# Patient Record
Sex: FEMALE | Race: WHITE | NOT HISPANIC OR LATINO | Employment: OTHER | URBAN - METROPOLITAN AREA
[De-identification: names, ages, dates, MRNs, and addresses within clinical notes are randomized per-mention and may not be internally consistent; named-entity substitution may affect disease eponyms.]

---

## 2019-09-11 ENCOUNTER — OFFICE VISIT (OUTPATIENT)
Dept: FAMILY MEDICINE | Facility: CLINIC | Age: 79
End: 2019-09-11
Payer: MEDICARE

## 2019-09-11 VITALS
WEIGHT: 195 LBS | HEART RATE: 84 BPM | BODY MASS INDEX: 38.28 KG/M2 | HEIGHT: 60 IN | DIASTOLIC BLOOD PRESSURE: 86 MMHG | SYSTOLIC BLOOD PRESSURE: 160 MMHG

## 2019-09-11 DIAGNOSIS — N18.30 TYPE 2 DIABETES MELLITUS WITH STAGE 3 CHRONIC KIDNEY DISEASE, WITH LONG-TERM CURRENT USE OF INSULIN: Primary | ICD-10-CM

## 2019-09-11 DIAGNOSIS — Z79.4 TYPE 2 DIABETES MELLITUS WITH STAGE 3 CHRONIC KIDNEY DISEASE, WITH LONG-TERM CURRENT USE OF INSULIN: Primary | ICD-10-CM

## 2019-09-11 DIAGNOSIS — E11.22 TYPE 2 DIABETES MELLITUS WITH STAGE 3 CHRONIC KIDNEY DISEASE, WITH LONG-TERM CURRENT USE OF INSULIN: Primary | ICD-10-CM

## 2019-09-11 DIAGNOSIS — K21.9 GASTROESOPHAGEAL REFLUX DISEASE, ESOPHAGITIS PRESENCE NOT SPECIFIED: ICD-10-CM

## 2019-09-11 DIAGNOSIS — E78.5 HYPERLIPIDEMIA, UNSPECIFIED HYPERLIPIDEMIA TYPE: ICD-10-CM

## 2019-09-11 DIAGNOSIS — I10 ESSENTIAL HYPERTENSION: ICD-10-CM

## 2019-09-11 LAB — HBA1C MFR BLD: 8.6 %

## 2019-09-11 PROCEDURE — 83036 POCT HEMOGLOBIN A1C: ICD-10-PCS | Mod: QW,,, | Performed by: PHYSICIAN ASSISTANT

## 2019-09-11 PROCEDURE — 99214 OFFICE O/P EST MOD 30 MIN: CPT | Mod: S$GLB,,, | Performed by: PHYSICIAN ASSISTANT

## 2019-09-11 PROCEDURE — 83036 HEMOGLOBIN GLYCOSYLATED A1C: CPT | Mod: QW,,, | Performed by: PHYSICIAN ASSISTANT

## 2019-09-11 PROCEDURE — 99214 PR OFFICE/OUTPT VISIT, EST, LEVL IV, 30-39 MIN: ICD-10-PCS | Mod: S$GLB,,, | Performed by: PHYSICIAN ASSISTANT

## 2019-09-11 RX ORDER — INSULIN GLARGINE 100 [IU]/ML
65 INJECTION, SOLUTION SUBCUTANEOUS DAILY
Qty: 1 BOX | Refills: 5 | Status: SHIPPED | OUTPATIENT
Start: 2019-09-11 | End: 2020-01-24 | Stop reason: SDUPTHER

## 2019-09-11 RX ORDER — TRAMADOL HYDROCHLORIDE AND ACETAMINOPHEN 37.5; 325 MG/1; MG/1
37.5-325 TABLET, FILM COATED ORAL
COMMUNITY
Start: 2018-08-28 | End: 2020-01-24

## 2019-09-11 RX ORDER — NYSTATIN AND TRIAMCINOLONE ACETONIDE 100000; 1 [USP'U]/G; MG/G
CREAM TOPICAL
COMMUNITY
End: 2020-01-24

## 2019-09-11 RX ORDER — SIMVASTATIN 80 MG/1
TABLET, FILM COATED ORAL
COMMUNITY
End: 2019-12-04 | Stop reason: ALTCHOICE

## 2019-09-11 RX ORDER — ATORVASTATIN CALCIUM 40 MG/1
TABLET, FILM COATED ORAL
Refills: 1 | COMMUNITY
Start: 2019-06-21 | End: 2019-12-04 | Stop reason: SDUPTHER

## 2019-09-11 RX ORDER — LOSARTAN POTASSIUM 100 MG/1
TABLET ORAL
Refills: 1 | COMMUNITY
Start: 2019-08-04 | End: 2019-11-08 | Stop reason: SDUPTHER

## 2019-09-11 RX ORDER — CHOLECALCIFEROL (VITAMIN D3) 10(400)/ML
DROPS ORAL
COMMUNITY
End: 2020-01-24

## 2019-09-11 RX ORDER — INSULIN GLARGINE 100 [IU]/ML
INJECTION, SOLUTION SUBCUTANEOUS
Refills: 5 | COMMUNITY
Start: 2019-08-22 | End: 2019-09-11 | Stop reason: SDUPTHER

## 2019-09-11 RX ORDER — GLIPIZIDE 5 MG/1
5 TABLET ORAL 3 TIMES DAILY
COMMUNITY
End: 2020-01-24 | Stop reason: SDUPTHER

## 2019-09-11 RX ORDER — FUROSEMIDE 20 MG/1
TABLET ORAL
COMMUNITY
End: 2020-01-24 | Stop reason: SDUPTHER

## 2019-09-11 RX ORDER — METFORMIN HYDROCHLORIDE 1000 MG/1
TABLET ORAL
COMMUNITY
End: 2019-11-08 | Stop reason: SDUPTHER

## 2019-09-11 RX ORDER — OMEPRAZOLE 40 MG/1
CAPSULE, DELAYED RELEASE ORAL
Refills: 1 | COMMUNITY
Start: 2019-06-21 | End: 2019-11-08 | Stop reason: SDUPTHER

## 2019-09-11 RX ORDER — DULOXETIN HYDROCHLORIDE 60 MG/1
CAPSULE, DELAYED RELEASE ORAL
Refills: 1 | COMMUNITY
Start: 2019-06-21 | End: 2020-01-24

## 2019-09-11 RX ORDER — TRAZODONE HYDROCHLORIDE 50 MG/1
TABLET ORAL
Refills: 1 | COMMUNITY
Start: 2019-07-02 | End: 2020-01-24 | Stop reason: SDUPTHER

## 2019-09-11 NOTE — PROGRESS NOTES
SUBJECTIVE:    Patient ID: Maryam White is a 79 y.o. female.    Chief Complaint: Follow-up    80 yo wf presents for regular checkup and refills. She reports that she has been doing pretty well overall. Does admit some compliance issues with diabetic diet. Says that she gets all of her normal doses. She says she very rarely has any readings over 200. A1c today 8.6% She denies any low BS readings. It seems that she has some element of cognitive impairment. She tells me that her grandson helps her with medication daily      No visits with results within 6 Month(s) from this visit.   Latest known visit with results is:   No results found for any previous visit.       History reviewed. No pertinent past medical history.  History reviewed. No pertinent surgical history.  History reviewed. No pertinent family history.    Marital Status: Single  Alcohol History:  reports that she does not drink alcohol.  Tobacco History:  reports that she has never smoked. She has never used smokeless tobacco.  Drug History:  reports that she does not use drugs.    Review of patient's allergies indicates:  No Known Allergies    Current Outpatient Medications:     amlodipine besylate (NORVASC ORAL), Take 10 mg by mouth., Disp: , Rfl:     aspirin-calcium carbonate 81 mg-300 mg calcium(777 mg) Tab, Take 81 mg by mouth., Disp: , Rfl:     atorvastatin (LIPITOR) 40 MG tablet, TK 1 T PO QD FOR CHOLESTEROL, Disp: , Rfl: 1    cholecalciferol, vitamin D3, (VITAMIN D3) 10 mcg/mL (400 unit/mL) Drop, Take by mouth., Disp: , Rfl:     DULoxetine (CYMBALTA) 60 MG capsule, TK 1 C PO QD, Disp: , Rfl: 1    furosemide (LASIX) 20 MG tablet, furosemide 20 mg tablet  qd, Disp: , Rfl:     glipiZIDE (GLUCOTROL) 5 MG tablet, glipizide 5 mg tablet, Disp: , Rfl:     LANTUS SOLOSTAR U-100 INSULIN glargine 100 units/mL (3mL) SubQ pen, Inject 65 Units into the skin once daily., Disp: 1 Box, Rfl: 5    losartan (COZAAR) 100 MG tablet, TK 1 T PO  D FOR BLOOD  PRESSURE, Disp: , Rfl: 1    metFORMIN (GLUCOPHAGE) 1000 MG tablet, metformin 1,000 mg tablet  bid, Disp: , Rfl:     nystatin-triamcinolone (MYCOLOG II) cream, nystatin-triamcinolone 100,000 unit/g-0.1 % topical cream, Disp: , Rfl:     omeprazole (PRILOSEC) 40 MG capsule, TK 1 C PO QD FOR ACID REFLUX, Disp: , Rfl: 1    simvastatin (ZOCOR) 80 MG tablet, simvastatin 80 mg tablet  qpm, Disp: , Rfl:     tramadol-acetaminophen 37.5-325 mg (ULTRACET) 37.5-325 mg Tab, Take 37.5-325 mg by mouth., Disp: , Rfl:     traZODone (DESYREL) 50 MG tablet, TK ONE T PO QHS PRN, Disp: , Rfl: 1    Review of Systems   Constitutional: Negative for appetite change, chills, fatigue, fever and unexpected weight change.   HENT: Negative for congestion.    Respiratory: Negative for cough, chest tightness and shortness of breath.    Cardiovascular: Negative for chest pain and palpitations.   Gastrointestinal: Negative for abdominal distention and abdominal pain.   Endocrine: Negative for cold intolerance and heat intolerance.   Genitourinary: Negative for difficulty urinating and dysuria.   Musculoskeletal: Negative for arthralgias and back pain.   Neurological: Negative for dizziness, weakness and headaches.          Objective:      Vitals:    09/11/19 1512   BP: (!) 160/86   Pulse: 84   Weight: 88.5 kg (195 lb)   Height: 5' (1.524 m)     Physical Exam   Constitutional: She is oriented to person, place, and time. She appears well-developed and well-nourished. No distress.   HENT:   Head: Normocephalic and atraumatic.   Eyes: Pupils are equal, round, and reactive to light. Conjunctivae and EOM are normal.   Neck: Normal range of motion. Neck supple. No thyromegaly present.   Cardiovascular: Normal rate, regular rhythm, normal heart sounds and intact distal pulses.   Pulmonary/Chest: Effort normal and breath sounds normal.   Abdominal: Soft. Bowel sounds are normal. She exhibits no distension. There is no tenderness.   Musculoskeletal:  Normal range of motion.   Neurological: She is alert and oriented to person, place, and time. No cranial nerve deficit.   Skin: Skin is warm and dry. No erythema.   Psychiatric: She has a normal mood and affect.         Assessment:       1. Type 2 diabetes mellitus with stage 3 chronic kidney disease, with long-term current use of insulin    2. Hyperlipidemia, unspecified hyperlipidemia type    3. Gastroesophageal reflux disease, esophagitis presence not specified    4. Essential hypertension         Plan:       Type 2 diabetes mellitus with stage 3 chronic kidney disease, with long-term current use of insulin  Comments:  A1c 8.6 and remains uncontrolled. I had long discussion with the pt and caregiver regarding diet. We will increase lantus to 65units. They verbalize understandi  Orders:  -     Hemoglobin A1C, POCT  -     LANTUS SOLOSTAR U-100 INSULIN glargine 100 units/mL (3mL) SubQ pen; Inject 65 Units into the skin once daily.  Dispense: 1 Box; Refill: 5    Hyperlipidemia, unspecified hyperlipidemia type  Comments:  stable, continue as is.    Gastroesophageal reflux disease, esophagitis presence not specified  Comments:  Sxs well managed. continue as is.    Essential hypertension      Follow up in about 3 months (around 12/11/2019).        9/11/2019 Asim Pate PA-C

## 2019-11-08 RX ORDER — OMEPRAZOLE 40 MG/1
CAPSULE, DELAYED RELEASE ORAL
Qty: 90 CAPSULE | Refills: 1 | Status: SHIPPED | OUTPATIENT
Start: 2019-11-08 | End: 2020-01-24 | Stop reason: SDUPTHER

## 2019-11-08 RX ORDER — METFORMIN HYDROCHLORIDE 1000 MG/1
TABLET ORAL
Qty: 180 TABLET | Refills: 1 | Status: SHIPPED | OUTPATIENT
Start: 2019-11-08 | End: 2020-01-24

## 2019-11-08 RX ORDER — LOSARTAN POTASSIUM 100 MG/1
TABLET ORAL
Qty: 90 TABLET | Refills: 1 | Status: SHIPPED | OUTPATIENT
Start: 2019-11-08 | End: 2020-01-24 | Stop reason: SDUPTHER

## 2019-11-08 RX ORDER — AMLODIPINE BESYLATE 10 MG/1
10 TABLET ORAL DAILY
Qty: 90 TABLET | Refills: 1 | Status: SHIPPED | OUTPATIENT
Start: 2019-11-08 | End: 2020-01-24

## 2019-11-08 NOTE — TELEPHONE ENCOUNTER
Spoke with pt son he is very upset bc his mom brought her bottles and she didn't get any refills.     Spoke with pharmacist at pharmacy, they have Duloxetine, Atorvastatin and Trazodone ready for her to . She needs Amlodipine. No lasix on file. Glipizide picked up on 10/3. Needs Losartan. Needs Metformin. Omeprazole picked up 10/3, no refills remain. Should not be on Simvastatin. Has 1 rx on hold for Ultracet.      She needs losartan, metformin, and omeprazole.

## 2019-12-04 ENCOUNTER — TELEPHONE (OUTPATIENT)
Dept: FAMILY MEDICINE | Facility: CLINIC | Age: 79
End: 2019-12-04

## 2019-12-04 DIAGNOSIS — E78.5 HYPERLIPIDEMIA, UNSPECIFIED HYPERLIPIDEMIA TYPE: ICD-10-CM

## 2019-12-04 DIAGNOSIS — Z79.4 TYPE 2 DIABETES MELLITUS WITH STAGE 3 CHRONIC KIDNEY DISEASE, WITH LONG-TERM CURRENT USE OF INSULIN: Primary | ICD-10-CM

## 2019-12-04 DIAGNOSIS — N18.30 TYPE 2 DIABETES MELLITUS WITH STAGE 3 CHRONIC KIDNEY DISEASE, WITH LONG-TERM CURRENT USE OF INSULIN: Primary | ICD-10-CM

## 2019-12-04 DIAGNOSIS — I10 ESSENTIAL HYPERTENSION: ICD-10-CM

## 2019-12-04 DIAGNOSIS — E11.22 TYPE 2 DIABETES MELLITUS WITH STAGE 3 CHRONIC KIDNEY DISEASE, WITH LONG-TERM CURRENT USE OF INSULIN: Primary | ICD-10-CM

## 2019-12-04 RX ORDER — ATORVASTATIN CALCIUM 40 MG/1
40 TABLET, FILM COATED ORAL DAILY
Qty: 90 TABLET | Refills: 1 | Status: SHIPPED | OUTPATIENT
Start: 2019-12-04 | End: 2020-01-24 | Stop reason: SDUPTHER

## 2019-12-04 NOTE — TELEPHONE ENCOUNTER
Son is upset that lipitor was not refilled at last ov.  Rx set up to send.  Next office visit scheduled in January.  Please sign lab orders.

## 2020-01-07 ENCOUNTER — TELEPHONE (OUTPATIENT)
Dept: FAMILY MEDICINE | Facility: CLINIC | Age: 80
End: 2020-01-07

## 2020-01-07 NOTE — TELEPHONE ENCOUNTER
LMOR to call Nancy CABRERA back so that I can let her know she is due for fasting lab work and an office visit.

## 2020-01-07 NOTE — TELEPHONE ENCOUNTER
Spoke to Kofi, son, that fasting lab is due as well as an office visit. Would like for her to see Dr Escoto. She had an appointment with him tomorrow but had to cancel because her son couldn't get her here. Said that he wants her to see Dr Escoto only this time, that it's been a couple of years, and she has had a hard time with her refills for years. He wants to get this straight, states she brings medication bottles every time but is still having issues. Please give work in with Dr Escoto after January 20th. Kofi [phone]

## 2020-01-08 NOTE — TELEPHONE ENCOUNTER
LMOR for Kofi that Ms Francisco is scheduled for January 24th at 10:40 and to bring all medication bottles, prescription and over the counter so that we can get her medications straight. Asked him to call with any questions, otherwise keep that office visit.

## 2020-01-24 ENCOUNTER — OFFICE VISIT (OUTPATIENT)
Dept: FAMILY MEDICINE | Facility: CLINIC | Age: 80
End: 2020-01-24
Payer: MEDICARE

## 2020-01-24 VITALS
HEART RATE: 68 BPM | HEIGHT: 60 IN | DIASTOLIC BLOOD PRESSURE: 80 MMHG | SYSTOLIC BLOOD PRESSURE: 134 MMHG | WEIGHT: 190 LBS | BODY MASS INDEX: 37.3 KG/M2

## 2020-01-24 DIAGNOSIS — M15.9 POLYARTICULAR OSTEOARTHRITIS: ICD-10-CM

## 2020-01-24 DIAGNOSIS — R41.89 COGNITIVE IMPAIRMENT: ICD-10-CM

## 2020-01-24 DIAGNOSIS — E11.22 TYPE 2 DIABETES MELLITUS WITH STAGE 3 CHRONIC KIDNEY DISEASE, WITH LONG-TERM CURRENT USE OF INSULIN: ICD-10-CM

## 2020-01-24 DIAGNOSIS — F51.01 PRIMARY INSOMNIA: ICD-10-CM

## 2020-01-24 DIAGNOSIS — K21.9 GASTROESOPHAGEAL REFLUX DISEASE WITHOUT ESOPHAGITIS: ICD-10-CM

## 2020-01-24 DIAGNOSIS — E78.5 HYPERLIPIDEMIA, UNSPECIFIED HYPERLIPIDEMIA TYPE: ICD-10-CM

## 2020-01-24 DIAGNOSIS — Z79.4 TYPE 2 DIABETES MELLITUS WITH STAGE 3 CHRONIC KIDNEY DISEASE, WITH LONG-TERM CURRENT USE OF INSULIN: Primary | ICD-10-CM

## 2020-01-24 DIAGNOSIS — N18.30 TYPE 2 DIABETES MELLITUS WITH STAGE 3 CHRONIC KIDNEY DISEASE, WITH LONG-TERM CURRENT USE OF INSULIN: ICD-10-CM

## 2020-01-24 DIAGNOSIS — Z79.4 TYPE 2 DIABETES MELLITUS WITH STAGE 3 CHRONIC KIDNEY DISEASE, WITH LONG-TERM CURRENT USE OF INSULIN: ICD-10-CM

## 2020-01-24 DIAGNOSIS — F32.A DEPRESSIVE DISORDER: ICD-10-CM

## 2020-01-24 DIAGNOSIS — N18.30 TYPE 2 DIABETES MELLITUS WITH STAGE 3 CHRONIC KIDNEY DISEASE, WITH LONG-TERM CURRENT USE OF INSULIN: Primary | ICD-10-CM

## 2020-01-24 DIAGNOSIS — Z79.4 TYPE 2 DIABETES MELLITUS WITH OTHER SPECIFIED COMPLICATION, WITH LONG-TERM CURRENT USE OF INSULIN: ICD-10-CM

## 2020-01-24 DIAGNOSIS — I10 ESSENTIAL HYPERTENSION: ICD-10-CM

## 2020-01-24 DIAGNOSIS — L40.9 PSORIASIS: ICD-10-CM

## 2020-01-24 DIAGNOSIS — E78.2 MIXED HYPERLIPIDEMIA: ICD-10-CM

## 2020-01-24 DIAGNOSIS — E11.22 TYPE 2 DIABETES MELLITUS WITH STAGE 3 CHRONIC KIDNEY DISEASE, WITH LONG-TERM CURRENT USE OF INSULIN: Primary | ICD-10-CM

## 2020-01-24 DIAGNOSIS — E11.69 TYPE 2 DIABETES MELLITUS WITH OTHER SPECIFIED COMPLICATION, WITH LONG-TERM CURRENT USE OF INSULIN: ICD-10-CM

## 2020-01-24 PROCEDURE — 1159F MED LIST DOCD IN RCRD: CPT | Mod: S$GLB,,, | Performed by: FAMILY MEDICINE

## 2020-01-24 PROCEDURE — 83036 HEMOGLOBIN GLYCOSYLATED A1C: CPT | Mod: QW,,, | Performed by: FAMILY MEDICINE

## 2020-01-24 PROCEDURE — 1159F PR MEDICATION LIST DOCUMENTED IN MEDICAL RECORD: ICD-10-PCS | Mod: S$GLB,,, | Performed by: FAMILY MEDICINE

## 2020-01-24 PROCEDURE — 83036 POCT HEMOGLOBIN A1C: ICD-10-PCS | Mod: QW,,, | Performed by: FAMILY MEDICINE

## 2020-01-24 PROCEDURE — 99214 OFFICE O/P EST MOD 30 MIN: CPT | Mod: S$GLB,,, | Performed by: FAMILY MEDICINE

## 2020-01-24 PROCEDURE — 99214 PR OFFICE/OUTPT VISIT, EST, LEVL IV, 30-39 MIN: ICD-10-PCS | Mod: S$GLB,,, | Performed by: FAMILY MEDICINE

## 2020-01-24 RX ORDER — LOSARTAN POTASSIUM 100 MG/1
TABLET ORAL
Qty: 90 TABLET | Refills: 1 | Status: SHIPPED | OUTPATIENT
Start: 2020-01-24

## 2020-01-24 RX ORDER — AMOXICILLIN 500 MG
CAPSULE ORAL DAILY
COMMUNITY
End: 2020-01-24

## 2020-01-24 RX ORDER — LANCETS 33 GAUGE
EACH MISCELLANEOUS
COMMUNITY
End: 2020-01-24 | Stop reason: SDUPTHER

## 2020-01-24 RX ORDER — GABAPENTIN 300 MG/1
300 CAPSULE ORAL 3 TIMES DAILY
Qty: 270 CAPSULE | Refills: 1 | Status: SHIPPED | OUTPATIENT
Start: 2020-01-24 | End: 2023-01-13 | Stop reason: ALTCHOICE

## 2020-01-24 RX ORDER — GLIPIZIDE 5 MG/1
5 TABLET ORAL 3 TIMES DAILY
Qty: 270 TABLET | Refills: 1 | Status: SHIPPED | OUTPATIENT
Start: 2020-01-24 | End: 2020-04-23

## 2020-01-24 RX ORDER — AMITRIPTYLINE HYDROCHLORIDE 25 MG/1
25 TABLET, FILM COATED ORAL NIGHTLY PRN
COMMUNITY
End: 2020-01-24

## 2020-01-24 RX ORDER — FUROSEMIDE 20 MG/1
TABLET ORAL
Qty: 90 TABLET | Refills: 1 | Status: SHIPPED | OUTPATIENT
Start: 2020-01-24 | End: 2020-01-24 | Stop reason: SDUPTHER

## 2020-01-24 RX ORDER — OMEPRAZOLE 40 MG/1
CAPSULE, DELAYED RELEASE ORAL
Qty: 90 CAPSULE | Refills: 1 | Status: SHIPPED | OUTPATIENT
Start: 2020-01-24 | End: 2023-01-13 | Stop reason: ALTCHOICE

## 2020-01-24 RX ORDER — GABAPENTIN 300 MG/1
300 CAPSULE ORAL 3 TIMES DAILY
COMMUNITY
End: 2020-01-24 | Stop reason: SDUPTHER

## 2020-01-24 RX ORDER — LANCETS 33 GAUGE
1 EACH MISCELLANEOUS 2 TIMES DAILY PRN
Qty: 100 EACH | Refills: 1 | Status: SHIPPED | OUTPATIENT
Start: 2020-01-24

## 2020-01-24 RX ORDER — METFORMIN HYDROCHLORIDE 1000 MG/1
TABLET ORAL
Qty: 180 TABLET | Refills: 1 | Status: SHIPPED | OUTPATIENT
Start: 2020-01-24 | End: 2023-01-13 | Stop reason: ALTCHOICE

## 2020-01-24 RX ORDER — FUROSEMIDE 20 MG/1
20 TABLET ORAL DAILY
Qty: 90 TABLET | Refills: 1 | Status: SHIPPED | OUTPATIENT
Start: 2020-01-24 | End: 2023-01-13

## 2020-01-24 RX ORDER — INSULIN GLARGINE 100 [IU]/ML
50 INJECTION, SOLUTION SUBCUTANEOUS DAILY
Qty: 3 BOX | Refills: 3 | Status: SHIPPED | OUTPATIENT
Start: 2020-01-24 | End: 2020-07-22

## 2020-01-24 RX ORDER — TRAZODONE HYDROCHLORIDE 50 MG/1
TABLET ORAL
Qty: 90 TABLET | Refills: 1 | Status: SHIPPED | OUTPATIENT
Start: 2020-01-24

## 2020-01-24 RX ORDER — DOCUSATE SODIUM 100 MG/1
100 CAPSULE, LIQUID FILLED ORAL 2 TIMES DAILY
COMMUNITY
End: 2020-01-24

## 2020-01-24 RX ORDER — ATORVASTATIN CALCIUM 40 MG/1
40 TABLET, FILM COATED ORAL DAILY
Qty: 90 TABLET | Refills: 1 | Status: SHIPPED | OUTPATIENT
Start: 2020-01-24 | End: 2020-04-23

## 2020-01-24 RX ORDER — FERROUS GLUCONATE 324(38)MG
324 TABLET ORAL
COMMUNITY
End: 2020-01-24

## 2020-01-24 NOTE — PROGRESS NOTES
SUBJECTIVE:    Patient ID: Maryam White is a 79 y.o. female.    Chief Complaint: Follow-up (no bottles  tb//set up for foot exam// A1c 7.7)    This 79-year-old female lives with her son in the [place] area.  The son states that she stays in her room and watches TV most of the time is able to move about the house but does not get out of the house much.  She has no recent falls she does eat 3 meals a day provided by her family.  They noticed a cognitive decline and problems with her memory.  She is independent in her showering but does no cooking or house cleaning chores at all.  When questioned about her medications or her insulin she does not remember the dosages or the names of any medications.  She relies on her family to provide these medications for her.      Office Visit on 2019   Component Date Value Ref Range Status    Hemoglobin A1C 2019 8.6  % Final       Past Medical History:   Diagnosis Date    Cataract     Depression     Diabetes mellitus, type 2     Hypertension      Past Surgical History:   Procedure Laterality Date     SECTION       SECTION      GALLBLADDER SURGERY      TONSILLECTOMY       No family history on file.    Marital Status: Single  Alcohol History:  reports that she does not drink alcohol.  Tobacco History:  reports that she has never smoked. She has never used smokeless tobacco.  Drug History:  reports that she does not use drugs.    Review of patient's allergies indicates:  No Known Allergies    Current Outpatient Medications:     aspirin-calcium carbonate 81 mg-300 mg calcium(777 mg) Tab, Take 81 mg by mouth., Disp: , Rfl:     atorvastatin (LIPITOR) 40 MG tablet, Take 1 tablet (40 mg total) by mouth once daily., Disp: 90 tablet, Rfl: 1    gabapentin (NEURONTIN) 300 MG capsule, Take 300 mg by mouth 3 (three) times daily., Disp: , Rfl:     glipiZIDE (GLUCOTROL) 5 MG tablet, Take 5 mg by mouth 3 (three) times daily. , Disp: , Rfl:     lancets  (BD ULTRA FINE LANCETS) 33 gauge Misc, by Misc.(Non-Drug; Combo Route) route., Disp: , Rfl:     liraglutide 0.6 mg/0.1 mL, 18 mg/3 mL, subq PNIJ (VICTOZA 2-ALICE) 0.6 mg/0.1 mL (18 mg/3 mL) PnIj, Inject into the skin., Disp: , Rfl:     losartan (COZAAR) 100 MG tablet, TK 1 T PO  D FOR BLOOD PRESSURE, Disp: 90 tablet, Rfl: 1    omeprazole (PRILOSEC) 40 MG capsule, TK 1 C PO QD FOR ACID REFLUX, Disp: 90 capsule, Rfl: 1    traZODone (DESYREL) 50 MG tablet, TK ONE T PO QHS PRN, Disp: , Rfl: 1    furosemide (LASIX) 20 MG tablet, furosemide 20 mg tablet  qd, Disp: , Rfl:     LANTUS SOLOSTAR U-100 INSULIN glargine 100 units/mL (3mL) SubQ pen, Inject 65 Units into the skin once daily., Disp: 1 Box, Rfl: 5    Review of Systems   Constitutional: Negative for appetite change, chills, fatigue, fever and unexpected weight change.   HENT: Negative for congestion, ear pain, sinus pain, sore throat and trouble swallowing.    Eyes: Negative for pain, discharge and visual disturbance.   Respiratory: Negative for apnea, cough, shortness of breath and wheezing.    Cardiovascular: Negative for chest pain, palpitations and leg swelling.   Gastrointestinal: Negative for abdominal pain, blood in stool, constipation, diarrhea, nausea and vomiting.        No gerd   Endocrine: Negative for heat intolerance, polydipsia and polyuria.   Genitourinary: Negative for difficulty urinating, dyspareunia, dysuria, frequency, hematuria and menstrual problem.        Nocturia 1-2 x  night   Musculoskeletal: Negative for arthralgias (rt lower  hip aches,), back pain, gait problem, joint swelling and myalgias.   Allergic/Immunologic: Negative for environmental allergies, food allergies and immunocompromised state.   Neurological: Negative for dizziness, tremors, seizures, numbness (rt  sided  sciatica, on gabapentin) and headaches.   Psychiatric/Behavioral: Negative for behavioral problems, confusion, hallucinations and suicidal ideas. The patient is  not nervous/anxious.           Objective:      Vitals:    01/24/20 1111   BP: 134/80   Pulse: 68   Weight: 86.2 kg (190 lb)   Height: 5' (1.524 m)     Body mass index is 37.11 kg/m².  Physical Exam   Constitutional: She is oriented to person, place, and time. She appears well-developed and well-nourished.   HENT:   Head: Normocephalic and atraumatic.   Right Ear: External ear normal.   Left Ear: External ear normal.   Nose: Nose normal.   Mouth/Throat: Oropharynx is clear and moist.   Eyes: Pupils are equal, round, and reactive to light. EOM are normal.   Neck: Normal range of motion. Neck supple. Carotid bruit is not present. No thyromegaly present.   Cardiovascular: Normal rate, regular rhythm, normal heart sounds and intact distal pulses.   No murmur heard.  Pulses:       Dorsalis pedis pulses are 2+ on the right side, and 2+ on the left side.        Posterior tibial pulses are 2+ on the right side, and 2+ on the left side.   Pulmonary/Chest: Effort normal and breath sounds normal. She has no wheezes. She has no rales.   Abdominal: Soft. Bowel sounds are normal. She exhibits no distension. There is no hepatosplenomegaly. There is no tenderness.   Musculoskeletal: Normal range of motion. She exhibits no tenderness or deformity.        Lumbar back: Normal. She exhibits no pain and no spasm.   Bends 90 degrees at  waist shoulders and knees have full range of motion.  She has no pitting edema to her lower extremities.  Her gait is somewhat slow and cautious, balance is somewhat off   Feet:   Right Foot:   Protective Sensation: 5 sites tested. 5 sites sensed.   Skin Integrity: Negative for ulcer or callus.   Left Foot:   Protective Sensation: 5 sites tested. 5 sites sensed.   Skin Integrity: Negative for ulcer or callus.   Lymphadenopathy:     She has no cervical adenopathy.   Neurological: She is alert and oriented to person, place, and time. No cranial nerve deficit. Coordination normal.   Mild hearing loss present.   She can remember only 1 out of the last 3 current president's   Skin: Skin is warm and dry. No rash noted.   Large patch of psoriasis on her lower back   Psychiatric: She has a normal mood and affect. Her behavior is normal. Judgment and thought content normal.   Short-term memory loss is evident   Nursing note and vitals reviewed.        Assessment:       1. Type 2 diabetes mellitus with stage 3 chronic kidney disease, with long-term current use of insulin    2. Depressive disorder    3. Psoriasis    4. Mixed hyperlipidemia    5. Essential hypertension    6. Type 2 diabetes mellitus with other specified complication, with long-term current use of insulin    7. Gastroesophageal reflux disease without esophagitis    8. Hyperlipidemia, unspecified hyperlipidemia type    9. Type 2 diabetes mellitus with stage 3 chronic kidney disease, with long-term current use of insulin    10. Cognitive impairment    11. Polyarticular osteoarthritis         Plan:       Type 2 diabetes mellitus with stage 3 chronic kidney disease, with long-term current use of insulin  A1c was 7.7 today which is improved from her former level.  She has only been taking 30 units of Lantus daily (recommended dose  was 65 units) due to her cognitive decline I fear she is not remembering to take her medications or her insulin appropriately  Depressive disorder    Psoriasis  Moderate psoriasis on her lower back which she is using a steroid cream borrowed from her son  Mixed hyperlipidemia  Labs ordered for next visit in 3 months  Essential hypertension  Blood pressure well controlled  Type 2 diabetes mellitus with other specified complication, with long-term current use of insulin  Diabetes is not in control yet  Gastroesophageal reflux disease without esophagitis    Hyperlipidemia, unspecified hyperlipidemia type    Type 2 diabetes mellitus with stage 3 chronic kidney disease, with long-term current use of insulin    Cognitive impairment  It looks like  patient developing Alzheimer's dementia.  We will do a mini-mental status exam in the future  Polyarticular osteoarthritis  Managing fairly well with her joint pains    No follow-ups on file.

## 2020-01-27 LAB — HBA1C MFR BLD: 7.7 %

## 2020-02-13 ENCOUNTER — TELEPHONE (OUTPATIENT)
Dept: FAMILY MEDICINE | Facility: CLINIC | Age: 80
End: 2020-02-13

## 2020-02-13 NOTE — TELEPHONE ENCOUNTER
----- Message from Angie Yao sent at 2/13/2020 10:29 AM CST -----  Contact:  Medical Pharmacy   AT 8:46 AM  The pharmacy said they faxed over a form for the patient and it was something about her being a diabetic. They needed some information. Please call [phone] GH

## 2020-02-27 ENCOUNTER — TELEPHONE (OUTPATIENT)
Dept: FAMILY MEDICINE | Facility: CLINIC | Age: 80
End: 2020-02-27

## 2020-02-27 NOTE — TELEPHONE ENCOUNTER
----- Message from Fatmata Ovalle sent at 2/27/2020 11:20 AM CST -----  [facility] about [facility] order about pt diabetic needles and medical records   Dorinda [phone]

## 2020-04-17 ENCOUNTER — TELEPHONE (OUTPATIENT)
Dept: FAMILY MEDICINE | Facility: CLINIC | Age: 80
End: 2020-04-17

## 2020-04-17 RX ORDER — DULOXETIN HYDROCHLORIDE 60 MG/1
60 CAPSULE, DELAYED RELEASE ORAL DAILY
Qty: 90 CAPSULE | Refills: 1 | Status: SHIPPED | OUTPATIENT
Start: 2020-04-17 | End: 2020-04-21 | Stop reason: SDUPTHER

## 2020-04-17 NOTE — TELEPHONE ENCOUNTER
----- Message from Stephanie Hernandes sent at 4/17/2020  8:55 AM CDT -----  Patients son his calling saying that his mother has been having issues with her rxs for years . Patients son went to [facility] and they stated they didn't have that medication on file for her to refill please give the son a call [phone] dirk arellano he states dr marley told him to call him when this happened again

## 2020-04-17 NOTE — TELEPHONE ENCOUNTER
Refill submitted to [facility].       Son wants Dr Escoto to know he had another refill issue. Please print message for him. Cymbalta was not in profile but was in history. Son will also activate portal so appointment can be converted. Thanks

## 2020-04-21 ENCOUNTER — OFFICE VISIT (OUTPATIENT)
Dept: FAMILY MEDICINE | Facility: CLINIC | Age: 80
End: 2020-04-21
Payer: MEDICARE

## 2020-04-21 VITALS
TEMPERATURE: 99 F | DIASTOLIC BLOOD PRESSURE: 68 MMHG | OXYGEN SATURATION: 98 % | SYSTOLIC BLOOD PRESSURE: 114 MMHG | HEART RATE: 68 BPM

## 2020-04-21 DIAGNOSIS — I10 ESSENTIAL HYPERTENSION: Primary | ICD-10-CM

## 2020-04-21 DIAGNOSIS — R41.89 COGNITIVE IMPAIRMENT: ICD-10-CM

## 2020-04-21 DIAGNOSIS — E11.69 TYPE 2 DIABETES MELLITUS WITH OTHER SPECIFIED COMPLICATION, WITH LONG-TERM CURRENT USE OF INSULIN: ICD-10-CM

## 2020-04-21 DIAGNOSIS — Z79.4 TYPE 2 DIABETES MELLITUS WITH OTHER SPECIFIED COMPLICATION, WITH LONG-TERM CURRENT USE OF INSULIN: ICD-10-CM

## 2020-04-21 PROCEDURE — 99213 OFFICE O/P EST LOW 20 MIN: CPT | Mod: 95,,, | Performed by: PHYSICIAN ASSISTANT

## 2020-04-21 PROCEDURE — 99213 PR OFFICE/OUTPT VISIT, EST, LEVL III, 20-29 MIN: ICD-10-PCS | Mod: 95,,, | Performed by: PHYSICIAN ASSISTANT

## 2020-04-21 RX ORDER — DULOXETIN HYDROCHLORIDE 60 MG/1
60 CAPSULE, DELAYED RELEASE ORAL DAILY
Qty: 90 CAPSULE | Refills: 1 | Status: SHIPPED | OUTPATIENT
Start: 2020-04-21 | End: 2020-10-18

## 2020-04-21 NOTE — PATIENT INSTRUCTIONS
Diet: Diabetes  Food is an important tool that you can use to control diabetes and stay healthy. Eating well-balanced meals in the correct amounts will help you control your blood glucose levels and prevent low blood sugar reactions. It will also help you reduce the health risks of diabetes. There is no one specific diet that is right for everyone with diabetes. But there are general guidelines to follow. A registered dietitian (RD) will create a tailored diet approach thats just right for you. He or she will also help you plan healthy meals and snacks. If you have any questions, call your dietitian for advice.     Guidelines for success  Talk with your healthcare provider before starting a diabetes diet or weight loss program. If you haven't talked with a dietitian yet, ask your provider for a referral. The following guidelines can help you succeed:  · Select foods from the 6 food groups below. Your dietitian will help you find food choices within each group. He or she will also show you serving sizes and how many servings you can have at each meal.  ¨ Grains, beans, and starchy vegetables  ¨ Vegetables  ¨ Fruit  ¨ Milk or yogurt  ¨ Meat, poultry, fish, or tofu  ¨ Healthy fats  · Check your blood sugar levels as directed by your provider. Take any medicine as prescribed by your provider.  · Learn to read food labels and pick the right portion sizes.  · Eat only the amount of food in your meal plan. Eat about the same amount of food at regular times each day. Dont skip meals. Eat meals 4 to 5 hours apart, with snacks in between.  · Limit alcohol. It raises blood sugar levels. Drink water or calorie-free diet drinks that use safe sweeteners.  · Eat less fat to help lower your risk of heart disease. Use nonfat or low-fat dairy products and lean meats. Avoid fried foods. Use cooking oils that are unsaturated, such as olive, canola, or peanut oil.  · Talk with your dietitian about safe sugar substitutes.  · Avoid  added salt. It can contribute to high blood pressure, which can cause heart disease. People with diabetes already have a risk of high blood pressure and heart disease.  · Stay at a healthy weight. If you need to lose weight, cut down on your portion sizes. But dont skip meals. Exercise is an important part of any weight management program. Talk with your provider about an exercise program thats right for you.  · For more information about the best diet plan for you, talk with a registered dietitian (RD). To find an RD in your area, contact:  ¨ Academy of Nutrition and Dietetics www.eatright.org  ¨ The American Diabetes Association [phone] www.diabetes.org  Date Last Reviewed: 8/1/2016 © 2000-2017 [employer]. [address] [place] [address], [place] [address]. All rights reserved. This information is not intended as a substitute for professional medical care. Always follow your healthcare professional's instructions.

## 2020-05-19 ENCOUNTER — TELEPHONE (OUTPATIENT)
Dept: FAMILY MEDICINE | Facility: CLINIC | Age: 80
End: 2020-05-19

## 2020-05-30 NOTE — PROGRESS NOTES
Subjective:        The chief complaint leading to consultation is: 3 month followup  The patient location is:  Home  Visit type: Virtual visit with synchronous audio/video or audio only  This was a video visit in lieu of in-person visit due to the coronavirus emergency. Patient acknowledged and consented to the video visit encounter.     This is a very pleasant 79-year-old white female who presents today for a virtual telemedicine conference.  She reports that she has been at her baseline.  Keeping herself at home as much as possible.  Her son does all of her shopping and helps her out around the house.  They report that her blood sugars have been averaging between 130 and 170.  He does not give the full dose of Lantus when her blood sugar is less than 100 upon awakening.  Her blood pressure has looked great and in fact we are recorded the pressure reading today.  She does need refills of her Cymbalta.  We want to get all of her other refills when we see her back in 3 months.  They are planning to get her blood work done ASAP.      Past Surgical History:   Procedure Laterality Date     SECTION       SECTION      GALLBLADDER SURGERY      TONSILLECTOMY       Past Medical History:   Diagnosis Date    Cataract     Depression     Diabetes mellitus, type 2     Hypertension      History reviewed. No pertinent family history.     Social History:   Marital Status: Single  Alcohol History:  reports that she does not drink alcohol.  Tobacco History:  reports that she has never smoked. She has never used smokeless tobacco.  Drug History:  reports that she does not use drugs.    Review of patient's allergies indicates:   Allergen Reactions    Metformin hcl      Other reaction(s): GEMA    Naproxen sodium      Other reaction(s): GEMA       Current Outpatient Medications   Medication Sig Dispense Refill    aspirin-calcium carbonate 81 mg-300 mg calcium(777 mg) Tab Take 81 mg by mouth.      atorvastatin  Pt taking ASA only  Is not prescribed a STATIN  Pt is debilitated and transfers from recliner to W/C  PT/OT eval and treat     (LIPITOR) 40 MG tablet Take 1 tablet (40 mg total) by mouth once daily. 90 tablet 1    DULoxetine (CYMBALTA) 60 MG capsule Take 1 capsule (60 mg total) by mouth once daily. 90 capsule 1    furosemide (LASIX) 20 MG tablet Take 1 tablet (20 mg total) by mouth once daily. furosemide 20 mg tablet  qd 90 tablet 1    gabapentin (NEURONTIN) 300 MG capsule Take 1 capsule (300 mg total) by mouth 3 (three) times daily. 270 capsule 1    glipiZIDE (GLUCOTROL) 5 MG tablet Take 1 tablet (5 mg total) by mouth 3 (three) times daily. 270 tablet 1    lancets (BD ULTRA FINE LANCETS) 33 gauge Misc 1 lancet by Misc.(Non-Drug; Combo Route) route 2 (two) times daily as needed. 100 each 1    LANTUS SOLOSTAR U-100 INSULIN glargine 100 units/mL (3mL) SubQ pen Inject 50 Units into the skin once daily. 3 Box 3    liraglutide 0.6 mg/0.1 mL, 18 mg/3 mL, subq PNIJ (VICTOZA 2-ALICE) 0.6 mg/0.1 mL (18 mg/3 mL) PnIj Inject into the skin.      losartan (COZAAR) 100 MG tablet TK 1 T PO  D FOR BLOOD PRESSURE 90 tablet 1    metFORMIN (GLUCOPHAGE) 1000 MG tablet metformin 1,000 mg tablet  bid 180 tablet 1    omeprazole (PRILOSEC) 40 MG capsule TK 1 C PO QD FOR ACID REFLUX 90 capsule 1    traZODone (DESYREL) 50 MG tablet TK ONE T PO QHS PRN 90 tablet 1     No current facility-administered medications for this visit.        Review of Systems   Constitutional: Negative for appetite change, chills, fatigue, fever and unexpected weight change.   HENT: Negative for congestion.    Respiratory: Negative for cough, chest tightness and shortness of breath.    Cardiovascular: Negative for chest pain and palpitations.   Gastrointestinal: Negative for abdominal distention and abdominal pain.   Endocrine: Negative for cold intolerance and heat intolerance.   Genitourinary: Negative for difficulty urinating and dysuria.   Musculoskeletal: Negative for arthralgias and back pain.   Neurological: Negative for dizziness, weakness and headaches.         Objective:         Physical Exam:   Physical Exam   Constitutional: She appears well-developed and well-nourished.   HENT:   Head: Normocephalic and atraumatic.   Neck: Normal range of motion.   Pulmonary/Chest: Effort normal. No respiratory distress.            Assessment:       1. Essential hypertension    2. Type 2 diabetes mellitus with other specified complication, with long-term current use of insulin    3. Cognitive impairment      Plan:   Essential hypertension  Comments:  Blood pressure remains well controlled.  Continue as is    Type 2 diabetes mellitus with other specified complication, with long-term current use of insulin  Comments:  I suspect that her A1c is low to mid 7s.  I do not want to push her blood sugars too much given her age.  Leave Lantus dose where it is    Cognitive impairment  Comments:  Appears stable at this time.    Other orders  -     DULoxetine (CYMBALTA) 60 MG capsule; Take 1 capsule (60 mg total) by mouth once daily.  Dispense: 90 capsule; Refill: 1      Follow up in about 3 months (around 7/21/2020) for Diabetic Check-Up.    Total time spent with patient: 20min    Each patient to whom he or she provides medical services by telemedicine is:  (1) informed of the relationship between the physician and patient and the respective role of any other health care provider with respect to management of the patient; and (2) notified that he or she may decline to receive medical services by telemedicine and may withdraw from such care at any time.    This note was created using [employer] voice recognition software that occasionally misinterprets phrases or words.

## 2020-06-05 ENCOUNTER — TELEPHONE (OUTPATIENT)
Dept: FAMILY MEDICINE | Facility: CLINIC | Age: 80
End: 2020-06-05

## 2020-06-05 NOTE — TELEPHONE ENCOUNTER
----- Message from Huong Barkley sent at 6/5/2020 10:11 AM CDT -----  Contact: Kofi White pt's son   Son says that he needs a doctors not stating why he can't be around the public for his job due to the fact that he cares for his mother and is taking care of her. Please give the patient son a call back # [phone]

## 2020-06-05 NOTE — TELEPHONE ENCOUNTER
Son aware you are not in clinic. Says he is going to be wearing a mask on Monday but would like to have a note saying he can not return to work currently because he is caring for his mother that was advised to stay inside by you during last virtual visit. Please advise.    ] Son is aware you are not in clinic. Thanks

## 2020-06-08 NOTE — TELEPHONE ENCOUNTER
I do not necessarily think that the patient has to stay home from work to safely care for his mother.  Plus I cannot mandate that his job keep him home when he has to be present for his job and does not have the option to work from home.  If he needs FMLA paperwork completed he should seek this out with his primary care provider

## 2020-06-15 ENCOUNTER — TELEPHONE (OUTPATIENT)
Dept: FAMILY MEDICINE | Facility: CLINIC | Age: 80
End: 2020-06-15

## 2020-06-15 NOTE — TELEPHONE ENCOUNTER
----- Message from Huong Barkley sent at 6/15/2020  2:02 PM CDT -----  Regarding: Leg and Hip pain  Contact: Kofi juan pablo's son  Son says that the patient is having pain in her leg and hip area for the past few weeks now and son would like to get her in for an appt this week if possible? Please give the son a call back Kofi's # [phone]

## 2023-01-13 ENCOUNTER — HOSPITAL ENCOUNTER (OUTPATIENT)
Dept: PREADMISSION TESTING | Facility: HOSPITAL | Age: 83
Discharge: HOME | End: 2023-01-13
Attending: INTERNAL MEDICINE
Payer: MEDICARE

## 2023-01-13 VITALS
HEART RATE: 82 BPM | BODY MASS INDEX: 34.75 KG/M2 | OXYGEN SATURATION: 95 % | TEMPERATURE: 98 F | SYSTOLIC BLOOD PRESSURE: 170 MMHG | RESPIRATION RATE: 18 BRPM | DIASTOLIC BLOOD PRESSURE: 80 MMHG | HEIGHT: 62 IN

## 2023-01-13 DIAGNOSIS — Z01.818 PRE-OP TESTING: Primary | ICD-10-CM

## 2023-01-13 PROCEDURE — 93010 ELECTROCARDIOGRAM REPORT: CPT | Mod: ,,, | Performed by: SPECIALIST

## 2023-01-13 PROCEDURE — 93010 EKG 12-LEAD: ICD-10-PCS | Mod: ,,, | Performed by: SPECIALIST

## 2023-01-13 PROCEDURE — 93005 ELECTROCARDIOGRAM TRACING: CPT | Performed by: SPECIALIST

## 2023-01-13 RX ORDER — TRIAMCINOLONE ACETONIDE 0.25 MG/G
CREAM TOPICAL 2 TIMES DAILY
COMMUNITY

## 2023-01-13 RX ORDER — ASPIRIN 81 MG/1
81 TABLET ORAL DAILY
COMMUNITY

## 2023-01-13 NOTE — DISCHARGE INSTRUCTIONS
To confirm, Your doctor has instructed you that procedure is scheduled for: Jan 17    1 Person can come with you the day of surgery     Endoscopy nurse will call Friday with your arrival time.      Please  Enter at [facility] [address] and come through front entrance.       Check in at registration.     After registration, proceed past gift shop and through glass door ( [facility]) Check in at the nurses station to the left.       Do not eat or drink anything after midnight the night before your surgery - THIS INCLUDES  WATER, GUM, MINTS AND CANDY.  YOU MAY BRUSH YOUR TEETH BUT DO NOT SWALLOW       TAKE ONLY THESE MEDICATIONS WITH A SMALL SIP OF WATER THE MORNING OF YOUR PROCEDURE: NONE      PLEASE NOTE:  The surgery schedule has many variables which may affect the time of your surgery case.  Family members should be available if your surgery time changes.  Plan to be here the day of your procedure between 4-6 hours.      DO NOT TAKE THESE MEDICATIONS 5-7 DAYS PRIOR to your procedure or per your surgeon's request: ASPIRIN, ALEVE, ADVIL, IBUPROFEN,  PRISCILLA SELTZER, BC , FISH OIL , VITAMIN E, HERBALS  (May take Tylenol)                                                        IMPORTANT INSTRUCTIONS      Do not smoke, vape or drink alcoholic beverages 24 hours prior to your procedure.  Shower the night before  and sleep in a bed with clean sheets.  Do not sleep with a pet in the bed.       If you wear contact lenses, dentures, hearing aids or glasses, bring a container to put them in during surgery and give to a family member for safe keeping.    Please leave all jewelry, piercing's and valuables at home.   Wear rubber soled shoes (no flip flops).  ONLY for patients requiring bowel prep, written instructions will be given by your doctor's office.  If your doctor has scheduled you for an overnight stay, bring a small overnight bag with any personal items you need.    Make arrangements in advance for transportation home  by a responsible adult.      You must make arrangements for transportation, TAXI'S, UBER'S OR LYFTS ARE NOT ALLOWED.        If you have any questions about these instructions, call (Monday - Friday)  Cduxlfdrg [phone]

## 2023-01-13 NOTE — PRE-PROCEDURE INSTRUCTIONS
Pre procedure instructions given after review of history and medications.  NPO after midnight.  Advised not to take diabetic meds or losartan am of surgery.  States will not take any meds am of procedure.  States was not told to stop aspirin prior,  Has healing incision to nose from skin cancer excision.  Son in attendance.  Questions answered and verbalized understanding.

## 2023-01-17 ENCOUNTER — ANESTHESIA EVENT (OUTPATIENT)
Dept: SURGERY | Facility: HOSPITAL | Age: 83
End: 2023-01-17
Payer: MEDICARE

## 2023-01-17 ENCOUNTER — ANESTHESIA (OUTPATIENT)
Dept: SURGERY | Facility: HOSPITAL | Age: 83
End: 2023-01-17
Payer: MEDICARE

## 2023-01-17 ENCOUNTER — HOSPITAL ENCOUNTER (OUTPATIENT)
Facility: HOSPITAL | Age: 83
Discharge: HOME | End: 2023-01-17
Attending: INTERNAL MEDICINE | Admitting: INTERNAL MEDICINE
Payer: MEDICARE

## 2023-01-17 VITALS
TEMPERATURE: 98 F | RESPIRATION RATE: 16 BRPM | SYSTOLIC BLOOD PRESSURE: 163 MMHG | WEIGHT: 209.44 LBS | OXYGEN SATURATION: 98 % | HEART RATE: 88 BPM | DIASTOLIC BLOOD PRESSURE: 70 MMHG | BODY MASS INDEX: 38.31 KG/M2

## 2023-01-17 DIAGNOSIS — R10.9 ABDOMINAL PAIN: ICD-10-CM

## 2023-01-17 LAB
GLUCOSE SERPL-MCNC: 129 MG/DL (ref 70–110)
GLUCOSE SERPL-MCNC: 137 MG/DL (ref 70–110)

## 2023-01-17 PROCEDURE — D9220A PRA ANESTHESIA: ICD-10-PCS | Mod: CRNA,,, | Performed by: NURSE ANESTHETIST, CERTIFIED REGISTERED

## 2023-01-17 PROCEDURE — 43262 ENDO CHOLANGIOPANCREATOGRAPH: CPT | Performed by: INTERNAL MEDICINE

## 2023-01-17 PROCEDURE — 25000003 PHARM REV CODE 250: Performed by: NURSE ANESTHETIST, CERTIFIED REGISTERED

## 2023-01-17 PROCEDURE — 25000003 PHARM REV CODE 250: Performed by: INTERNAL MEDICINE

## 2023-01-17 PROCEDURE — 63600175 PHARM REV CODE 636 W HCPCS: Performed by: NURSE ANESTHETIST, CERTIFIED REGISTERED

## 2023-01-17 PROCEDURE — 37000009 HC ANESTHESIA EA ADD 15 MINS: Performed by: INTERNAL MEDICINE

## 2023-01-17 PROCEDURE — 27202125 HC BALLOON, EXTRACTION (ANY): Performed by: INTERNAL MEDICINE

## 2023-01-17 PROCEDURE — 63600175 PHARM REV CODE 636 W HCPCS: Performed by: ANESTHESIOLOGY

## 2023-01-17 PROCEDURE — C1769 GUIDE WIRE: HCPCS | Performed by: INTERNAL MEDICINE

## 2023-01-17 PROCEDURE — D9220A PRA ANESTHESIA: ICD-10-PCS | Mod: ANES,,, | Performed by: ANESTHESIOLOGY

## 2023-01-17 PROCEDURE — 82962 GLUCOSE BLOOD TEST: CPT | Performed by: INTERNAL MEDICINE

## 2023-01-17 PROCEDURE — D9220A PRA ANESTHESIA: Mod: ANES,,, | Performed by: ANESTHESIOLOGY

## 2023-01-17 PROCEDURE — 43264 ERCP REMOVE DUCT CALCULI: CPT | Performed by: INTERNAL MEDICINE

## 2023-01-17 PROCEDURE — 37000008 HC ANESTHESIA 1ST 15 MINUTES: Performed by: INTERNAL MEDICINE

## 2023-01-17 PROCEDURE — D9220A PRA ANESTHESIA: Mod: CRNA,,, | Performed by: NURSE ANESTHETIST, CERTIFIED REGISTERED

## 2023-01-17 PROCEDURE — 63600175 PHARM REV CODE 636 W HCPCS

## 2023-01-17 RX ORDER — ROCURONIUM BROMIDE 10 MG/ML
INJECTION, SOLUTION INTRAVENOUS
Status: DISCONTINUED | OUTPATIENT
Start: 2023-01-17 | End: 2023-01-17

## 2023-01-17 RX ORDER — INDOMETHACIN 50 MG/1
SUPPOSITORY RECTAL
Status: DISCONTINUED | OUTPATIENT
Start: 2023-01-17 | End: 2023-01-17 | Stop reason: HOSPADM

## 2023-01-17 RX ORDER — HYDRALAZINE HYDROCHLORIDE 20 MG/ML
10 INJECTION INTRAMUSCULAR; INTRAVENOUS ONCE
Status: COMPLETED | OUTPATIENT
Start: 2023-01-17 | End: 2023-01-17

## 2023-01-17 RX ORDER — FENTANYL CITRATE 50 UG/ML
INJECTION, SOLUTION INTRAMUSCULAR; INTRAVENOUS
Status: DISCONTINUED | OUTPATIENT
Start: 2023-01-17 | End: 2023-01-17

## 2023-01-17 RX ORDER — ONDANSETRON 2 MG/ML
INJECTION INTRAMUSCULAR; INTRAVENOUS
Status: DISCONTINUED | OUTPATIENT
Start: 2023-01-17 | End: 2023-01-17

## 2023-01-17 RX ORDER — LIDOCAINE HYDROCHLORIDE 20 MG/ML
JELLY TOPICAL
Status: DISCONTINUED | OUTPATIENT
Start: 2023-01-17 | End: 2023-01-17

## 2023-01-17 RX ORDER — ONDANSETRON 2 MG/ML
4 INJECTION INTRAMUSCULAR; INTRAVENOUS DAILY PRN
Status: DISCONTINUED | OUTPATIENT
Start: 2023-01-17 | End: 2023-01-17 | Stop reason: HOSPADM

## 2023-01-17 RX ORDER — SUCCINYLCHOLINE CHLORIDE 20 MG/ML
INJECTION INTRAMUSCULAR; INTRAVENOUS
Status: DISCONTINUED | OUTPATIENT
Start: 2023-01-17 | End: 2023-01-17

## 2023-01-17 RX ORDER — HYDRALAZINE HYDROCHLORIDE 20 MG/ML
INJECTION INTRAMUSCULAR; INTRAVENOUS
Status: COMPLETED
Start: 2023-01-17 | End: 2023-01-17

## 2023-01-17 RX ORDER — FENTANYL CITRATE 50 UG/ML
25 INJECTION, SOLUTION INTRAMUSCULAR; INTRAVENOUS EVERY 5 MIN PRN
Status: DISCONTINUED | OUTPATIENT
Start: 2023-01-17 | End: 2023-01-17 | Stop reason: HOSPADM

## 2023-01-17 RX ORDER — PROPOFOL 10 MG/ML
VIAL (ML) INTRAVENOUS
Status: DISCONTINUED | OUTPATIENT
Start: 2023-01-17 | End: 2023-01-17

## 2023-01-17 RX ORDER — SODIUM CHLORIDE 9 MG/ML
INJECTION, SOLUTION INTRAVENOUS CONTINUOUS
Status: DISCONTINUED | OUTPATIENT
Start: 2023-01-17 | End: 2023-01-17 | Stop reason: HOSPADM

## 2023-01-17 RX ORDER — FAMOTIDINE 10 MG/ML
INJECTION INTRAVENOUS
Status: DISCONTINUED | OUTPATIENT
Start: 2023-01-17 | End: 2023-01-17

## 2023-01-17 RX ORDER — LIDOCAINE HYDROCHLORIDE 20 MG/ML
INJECTION, SOLUTION EPIDURAL; INFILTRATION; INTRACAUDAL; PERINEURAL
Status: DISCONTINUED | OUTPATIENT
Start: 2023-01-17 | End: 2023-01-17

## 2023-01-17 RX ADMIN — HYDRALAZINE HYDROCHLORIDE 10 MG: 20 INJECTION INTRAMUSCULAR; INTRAVENOUS at 04:01

## 2023-01-17 RX ADMIN — ROCURONIUM BROMIDE 5 MG: 10 INJECTION, SOLUTION INTRAVENOUS at 03:01

## 2023-01-17 RX ADMIN — LIDOCAINE HYDROCHLORIDE 60 MG: 20 INJECTION, SOLUTION EPIDURAL; INFILTRATION; INTRACAUDAL; PERINEURAL at 03:01

## 2023-01-17 RX ADMIN — Medication 120 MG: at 03:01

## 2023-01-17 RX ADMIN — FENTANYL CITRATE 25 MCG: 50 INJECTION INTRAMUSCULAR; INTRAVENOUS at 04:01

## 2023-01-17 RX ADMIN — ONDANSETRON 4 MG: 2 INJECTION INTRAMUSCULAR; INTRAVENOUS at 03:01

## 2023-01-17 RX ADMIN — FAMOTIDINE 20 MG: 10 INJECTION, SOLUTION INTRAVENOUS at 03:01

## 2023-01-17 RX ADMIN — FENTANYL CITRATE 50 MCG: 50 INJECTION INTRAMUSCULAR; INTRAVENOUS at 03:01

## 2023-01-17 RX ADMIN — SODIUM CHLORIDE: 0.9 INJECTION, SOLUTION INTRAVENOUS at 03:01

## 2023-01-17 RX ADMIN — LIDOCAINE HYDROCHLORIDE 1 EACH: 20 JELLY TOPICAL at 03:01

## 2023-01-17 RX ADMIN — PROPOFOL 80 MG: 10 INJECTION, EMULSION INTRAVENOUS at 03:01

## 2023-01-17 NOTE — ANESTHESIA POSTPROCEDURE EVALUATION
Anesthesia Post Evaluation    Patient: Maryam White    Procedure(s) Performed: Procedure(s) (LRB):  ERCP (ENDOSCOPIC RETROGRADE CHOLANGIOPANCREATOGRAPHY) (N/A)    Final Anesthesia Type: general      Patient location during evaluation: PACU  Patient participation: Yes- Able to Participate  Level of consciousness: awake and alert and oriented  Post-procedure vital signs: reviewed and stable  Pain management: adequate  Airway patency: patent    PONV status at discharge: No PONV  Anesthetic complications: no      Cardiovascular status: blood pressure returned to baseline, hemodynamically stable and stable  Respiratory status: unassisted, spontaneous ventilation and room air  Hydration status: euvolemic  Follow-up not needed.          Vitals Value Taken Time   /70 01/17/23 1700   Temp 36.9 °C (98.4 °F) 01/17/23 1557   Pulse 88 01/17/23 1704   Resp 16 01/17/23 1700   SpO2 94 % 01/17/23 1704   Vitals shown include unvalidated device data.      Event Time   Out of Recovery 17:05:00         Pain/Em Score: Pain Rating Prior to Med Admin: 6 (1/17/2023  4:42 PM)  Em Score: 10 (1/17/2023  5:00 PM)

## 2023-01-17 NOTE — H&P
GASTROENTEROLOGY PRE-PROCEDURE H&P NOTE  Patient Name: Maryam White  Patient MRN: [id number]  Patient : 1940    Service date: 2023    PCP: Adan Jarrell NP    No chief complaint on file.      HPI: Patient is a 82 y.o. female with PMHx as below here for evaluation of abd pain and acute onset LFT / lipase elevation. Past ERCP w/ dilated biliary and large diverticulum likely pushing on distal duct .     Past Medical History:  Past Medical History:   Diagnosis Date    Bell's palsy     Cataract     Depression     Diabetes mellitus, type 2     30+    Hypertension     Pancreatitis     x 3    Psoriasis     Skin cancer     Uses walker         Past Surgical History:  Past Surgical History:   Procedure Laterality Date    APPENDECTOMY       SECTION       SECTION      COLONOSCOPY      ERCP      GALLBLADDER SURGERY      SKIN CANCER EXCISION      TONSILLECTOMY          Home Medications:  Medications Prior to Admission   Medication Sig Dispense Refill Last Dose    aspirin (ECOTRIN) 81 MG EC tablet Take 81 mg by mouth once daily.       atorvastatin (LIPITOR) 40 MG tablet Take 1 tablet (40 mg total) by mouth once daily. 90 tablet 1     DULoxetine (CYMBALTA) 60 MG capsule Take 1 capsule (60 mg total) by mouth once daily. 90 capsule 1     glipiZIDE (GLUCOTROL) 5 MG tablet Take 1 tablet (5 mg total) by mouth 3 (three) times daily. (Patient taking differently: Take 5 mg by mouth 2 (two) times daily with meals.) 270 tablet 1     lancets (BD ULTRA FINE LANCETS) 33 gauge Misc 1 lancet by Misc.(Non-Drug; Combo Route) route 2 (two) times daily as needed. 100 each 1     LANTUS SOLOSTAR U-100 INSULIN glargine 100 units/mL (3mL) SubQ pen Inject 50 Units into the skin once daily. (Patient taking differently: Inject 50 Units into the skin once daily. 40- 70 units) 3 Box 3     losartan (COZAAR) 100 MG tablet TK 1 T PO  D FOR BLOOD PRESSURE 90 tablet 1     traZODone (DESYREL) 50 MG tablet TK ONE T PO QHS PRN  (Patient taking differently: Take 50 mg by mouth every evening. TK ONE T PO QHS PRN) 90 tablet 1     triamcinolone acetonide 0.025% (KENALOG) 0.025 % cream Apply topically 2 (two) times daily. Breast area - folds of the skin          Inpatient Medications:        Review of patient's allergies indicates:   Allergen Reactions    Ibuprofen      GEMA    Metformin hcl      Other reaction(s): GEMA    Naproxen sodium      Other reaction(s): GEMA       Social History:   Social History     Occupational History    Not on file   Tobacco Use    Smoking status: Never    Smokeless tobacco: Never   Substance and Sexual Activity    Alcohol use: Never    Drug use: Never    Sexual activity: Not on file       Family History:   History reviewed. No pertinent family history.    Review of Systems:  A 10 point review of systems was performed and was normal, except as mentioned in the HPI, including constitutional, HEENT, heme, lymph, cardiovascular, respiratory, gastrointestinal, genitourinary, neurologic, endocrine, psychiatric and musculoskeletal.      OBJECTIVE:    Physical Exam:  24 Hour Vital Sign Ranges: Temp:  [98.9 °F (37.2 °C)] 98.9 °F (37.2 °C)  Pulse:  [80] 80  Resp:  [16] 16  SpO2:  [96 %] 96 %  BP: (150)/(67) 150/67  Most recent vitals: BP (!) 150/67   Pulse 80   Temp 98.9 °F (37.2 °C)   Resp 16   Wt 95 kg (209 lb 7 oz)   SpO2 96%   BMI 38.31 kg/m²    GEN: well-developed, well-nourished, awake and alert, non-toxic appearing adult  HEENT: PERRL, sclera anicteric, oral mucosa pink and moist without lesion  NECK: trachea midline; Good ROM  CV: regular rate and rhythm, no murmurs or gallops  RESP: clear to auscultation bilaterally, no wheezes, rhonci or rales  ABD: soft, non-tender, non-distended, normal bowel sounds  EXT: no swelling or edema, 2+ pulses distally  SKIN: no rashes or jaundice  PSYCH: normal affect    Labs:   No results for input(s): WBC, MCV, PLT in the last 72 hours.    Invalid input(s): HGBAU  No results for  input(s): NA, K, CL, CO2, BUN, GLU in the last 72 hours.    Invalid input(s): CREA  No results for input(s): ALB in the last 72 hours.    Invalid input(s): ALKP, SGOT, SGPT, TBIL, DBIL, TPRO  No results for input(s): PT, INR, PTT in the last 72 hours.      IMPRESSION / RECOMMENDATIONS:  ERCP  with interventions as warranted.   RIsks, benefits, alternatives discussed in detail regarding upcoming procedures and sedation. Some of the more common endoscopic complications include but not limited to immediate or delayed perforation, bleeding, infections, pain, inadvertent injury to surrounding tissue / organs and possible need for surgical evaluation. Patient expressed understanding, all questions answered and will proceed with procedure as planned.     Rambo Rutledge III  1/17/2023  3:02 PM

## 2023-01-17 NOTE — TRANSFER OF CARE
Anesthesia Transfer of Care Note    Patient: Maryam White    Procedure(s) Performed: Procedure(s) (LRB):  ERCP (ENDOSCOPIC RETROGRADE CHOLANGIOPANCREATOGRAPHY) (N/A)    Patient location: PACU    Anesthesia Type: general    Transport from OR: Transported from OR on room air with adequate spontaneous ventilation    Post pain: adequate analgesia    Post assessment: no apparent anesthetic complications and tolerated procedure well    Post vital signs: stable    Level of consciousness: responds to stimulation    Nausea/Vomiting: no nausea/vomiting    Complications: none    Transfer of care protocol was followedComments: SV, exchanging well.  To PACU, VSS upon arrival. Report to RN       Last vitals:   Visit Vitals  BP (!) 150/67   Pulse 80   Temp 37.2 °C (98.9 °F)   Resp 16   Wt 95 kg (209 lb 7 oz)   SpO2 96%   BMI 38.31 kg/m²

## 2023-01-17 NOTE — ANESTHESIA PREPROCEDURE EVALUATION
2023  Maryam White is a 82 y.o., female.      Pre-op Assessment    I have reviewed the Patient Summary Reports.     I have reviewed the Nursing Notes. I have reviewed the NPO Status.   I have reviewed the Medications.     Review of Systems  Anesthesia Hx:  No problems with previous Anesthesia  Neg history of prior surgery. Denies Family Hx of Anesthesia complications.   Denies Personal Hx of Anesthesia complications.   Social:  Non-Smoker, No Alcohol Use    Hematology/Oncology:  Hematology Normal   Oncology Normal     EENT/Dental:EENT/Dental Normal   Cardiovascular:   Hypertension    Pulmonary:  Pulmonary Normal    Renal/:  Renal/ Normal     Hepatic/GI:   GERD Pancreatitis   Musculoskeletal:   Arthritis     Neurological:   Neuromuscular disease: [place] palsy.    Endocrine:   Diabetes, type 2    Dermatological:  Skin Normal Psoriasis   Psych:   depression          Patient Active Problem List   Diagnosis    Depressive disorder    Essential hypertension    Gastroesophageal reflux disease    Hyperlipidemia    Psoriasis    Type 2 diabetes mellitus with other specified complication    Cognitive impairment    Polyarticular osteoarthritis    Primary insomnia       Past Surgical History:   Procedure Laterality Date    APPENDECTOMY       SECTION       SECTION      COLONOSCOPY      ERCP      GALLBLADDER SURGERY      SKIN CANCER EXCISION      TONSILLECTOMY          Tobacco Use:  The patient  reports that she has never smoked. She has never used smokeless tobacco.     Results for orders placed or performed during the hospital encounter of 23   EKG 12-lead    Collection Time: 23  1:36 PM    Narrative    Test Reason : Z01.818,    Vent. Rate : 078 BPM     Atrial Rate : 078 BPM     P-R Int : 144 ms          QRS Dur : 080 ms      QT Int : 390 ms       P-R-T Axes : 047  045 030 degrees     QTc Int : 444 ms    Normal sinus rhythm  Normal ECG  No previous ECGs available  Confirmed by Alejo MAGANA, Sarwat HE ([id number]) on 1/16/2023 8:37:32 PM    Referred By:             Confirmed By:Sarwat Dhillon MD             Lab Results   Component Value Date    WBC 9.46 01/13/2023    HGB 14.0 01/13/2023    HCT 42.4 01/13/2023    MCV 93 01/13/2023     01/13/2023     BMP  Lab Results   Component Value Date     01/13/2023    K 3.8 01/13/2023     01/13/2023    CO2 29 01/13/2023    BUN 12 01/13/2023    CREATININE 0.9 01/13/2023    CALCIUM 9.6 01/13/2023    ANIONGAP 8 01/13/2023     (H) 01/13/2023       No results found for this or any previous visit.          Physical Exam  General: Well nourished and Alert    Airway:  Mallampati: II   Mouth Opening: Normal  TM Distance: Normal  Tongue: Normal  Neck ROM: Normal ROM    Dental:  Intact    Chest/Lungs:  Clear to auscultation, Normal Respiratory Rate    Heart:  Rate: Normal  Rhythm: Regular Rhythm  Sounds: Normal        Anesthesia Plan  Type of Anesthesia, risks & benefits discussed:    Anesthesia Type: Gen ETT  Intra-op Monitoring Plan: Standard ASA Monitors  Induction:  IV  Informed Consent: Informed consent signed with the Patient and all parties understand the risks and agree with anesthesia plan.  All questions answered. Patient consented to blood products? Yes  ASA Score: 3    Ready For Surgery From Anesthesia Perspective.     .

## 2023-01-17 NOTE — [employer] PATIENT INSTRUCTIONS
Discharge Summary/Instructions after an Endoscopic Procedure  Patient Name: Maryam White  Patient MRN: [id number]  Patient YOB: 1940 Tuesday, January 17, 2023  Rambo Rutledge III, MD  RESTRICTIONS:  During your procedure today, you received medications for sedation.  These   medications may affect your judgment, balance and coordination.  Therefore,   for 24 hours, you have the following restrictions:   - DO NOT drive a car, operate machinery, make legal/financial decisions,   sign important papers or drink alcohol.    ACTIVITY:  Today: no heavy lifting, straining or running due to procedural   sedation/anesthesia.  The following day: return to full activity including work.  DIET:  Eat and drink normally unless instructed otherwise.     TREATMENT FOR COMMON SIDE EFFECTS:  - Mild abdominal pain, nausea, belching, bloating or excessive gas:  rest,   eat lightly and use a heating pad.  - Sore Throat: treat with throat lozenges and/or gargle with warm salt   water.  - Because air was used during the procedure, expelling large amounts of air   from your rectum or belching is normal.  - If a bowel prep was taken, you may not have a bowel movement for 1-3 days.    This is normal.  SYMPTOMS TO WATCH FOR AND REPORT TO YOUR PHYSICIAN:  1. Abdominal pain or bloating, other than gas cramps.  2. Chest pain.  3. Back pain.  4. Signs of infection such as: chills or fever occurring within 24 hours   after the procedure.  5. Rectal bleeding, which would show as bright red, maroon, or black stools.   (A tablespoon of blood from the rectum is not serious, especially if   hemorrhoids are present.)  6. Vomiting.  7. Weakness or dizziness.  GO DIRECTLY TO THE NEAREST EMERGENCY ROOM IF YOU HAVE ANY OF THE FOLLOWING:      Difficulty breathing              Chills and/or fever over 101 F   Persistent vomiting and/or vomiting blood   Severe abdominal pain   Severe chest pain   Black, tarry stools   Bleeding- more than one  tablespoon   Any other symptom or condition that you feel may need urgent attention  Your doctor recommends these additional instructions:  If any biopsies were taken, your doctors clinic will contact you in 1 to 2   weeks with any results.  - Advance diet as tolerated.   - Continue present medications.   - Discharge patient to home (with escort).  For questions, problems or results please call your physician - Rambo Rutledge III, MD at Work:  [phone].  [facility], EMERGENCY ROOM PHONE NUMBER: [phone]  IF A COMPLICATION OR EMERGENCY SITUATION ARISES AND YOU ARE UNABLE TO REACH   YOUR PHYSICIAN - GO DIRECTLY TO THE EMERGENCY ROOM.  Rambo Rutledge III, MD  1/17/2023 3:52:11 PM  This report has been verified and signed electronically.  Dear patient,  As a result of recent federal legislation (The Federal Cures Act), you may   receive lab or pathology results from your procedure in your [facility]   account before your physician is able to contact you. Your physician or   their representative will relay the results to you with their   recommendations at their soonest availability.  Thank you,  [employer]

## 2023-03-20 ENCOUNTER — OFFICE VISIT (OUTPATIENT)
Dept: URGENT CARE | Facility: CLINIC | Age: 83
End: 2023-03-20
Payer: MEDICARE

## 2023-03-20 VITALS
BODY MASS INDEX: 38.23 KG/M2 | WEIGHT: 209 LBS | HEART RATE: 92 BPM | DIASTOLIC BLOOD PRESSURE: 84 MMHG | SYSTOLIC BLOOD PRESSURE: 134 MMHG | OXYGEN SATURATION: 95 % | TEMPERATURE: 97 F | RESPIRATION RATE: 16 BRPM

## 2023-03-20 DIAGNOSIS — J40 BRONCHITIS: ICD-10-CM

## 2023-03-20 DIAGNOSIS — R05.9 COUGH, UNSPECIFIED TYPE: Primary | ICD-10-CM

## 2023-03-20 LAB
CTP QC/QA: YES
CTP QC/QA: YES
FLUAV AG NPH QL: NEGATIVE
FLUBV AG NPH QL: NEGATIVE
SARS-COV-2 AG RESP QL IA.RAPID: NEGATIVE

## 2023-03-20 PROCEDURE — 87811 SARS CORONAVIRUS 2 ANTIGEN POCT, MANUAL READ: ICD-10-PCS | Mod: QW,CR,S$GLB, | Performed by: STUDENT IN AN ORGANIZED HEALTH CARE EDUCATION/TRAINING PROGRAM

## 2023-03-20 PROCEDURE — 99204 OFFICE O/P NEW MOD 45 MIN: CPT | Mod: S$GLB,,, | Performed by: STUDENT IN AN ORGANIZED HEALTH CARE EDUCATION/TRAINING PROGRAM

## 2023-03-20 PROCEDURE — 99204 PR OFFICE/OUTPT VISIT, NEW, LEVL IV, 45-59 MIN: ICD-10-PCS | Mod: S$GLB,,, | Performed by: STUDENT IN AN ORGANIZED HEALTH CARE EDUCATION/TRAINING PROGRAM

## 2023-03-20 PROCEDURE — 87804 POCT INFLUENZA A/B: ICD-10-PCS | Mod: 59,QW,, | Performed by: STUDENT IN AN ORGANIZED HEALTH CARE EDUCATION/TRAINING PROGRAM

## 2023-03-20 PROCEDURE — 87804 INFLUENZA ASSAY W/OPTIC: CPT | Mod: 59,QW,, | Performed by: STUDENT IN AN ORGANIZED HEALTH CARE EDUCATION/TRAINING PROGRAM

## 2023-03-20 PROCEDURE — 87811 SARS-COV-2 COVID19 W/OPTIC: CPT | Mod: QW,CR,S$GLB, | Performed by: STUDENT IN AN ORGANIZED HEALTH CARE EDUCATION/TRAINING PROGRAM

## 2023-03-20 RX ORDER — DOXYCYCLINE HYCLATE 100 MG
100 TABLET ORAL 2 TIMES DAILY
Qty: 20 TABLET | Refills: 0 | Status: SHIPPED | OUTPATIENT
Start: 2023-03-20 | End: 2023-03-30

## 2023-03-20 RX ORDER — BENZONATATE 200 MG/1
200 CAPSULE ORAL 3 TIMES DAILY PRN
Qty: 30 CAPSULE | Refills: 0 | Status: SHIPPED | OUTPATIENT
Start: 2023-03-20 | End: 2023-03-30

## 2023-03-20 RX ORDER — ALBUTEROL SULFATE 90 UG/1
1-2 AEROSOL, METERED RESPIRATORY (INHALATION) EVERY 6 HOURS PRN
Qty: 18 G | Refills: 0 | Status: SHIPPED | OUTPATIENT
Start: 2023-03-20

## 2023-03-20 NOTE — PROGRESS NOTES
Subjective:       Patient ID: Maryam White is a 82 y.o. female.    Vitals:  weight is 94.8 kg (209 lb). Her temperature is 97.4 °F (36.3 °C). Her blood pressure is 134/84 and her pulse is 92. Her respiration is 16 and oxygen saturation is 95%.     Chief Complaint: Cough    Patient is an 82-year-old female with a past medical history of Bell's palsy, type 2 diabetes, hypertension, GERD, osteoarthritis, insomnia, depression, hyperlipidemia, and psoriasis who presents to clinic via family for evaluation of cough and congestion.  Patient reports she is vaccinated.  Patient reports no recent or known sick exposures.  Patient reports symptoms x2 days.  Family reports over-the-counter medications with no relief of symptoms.  Family reports because of patients age, they want her evaluated for her symptoms.    Cough  The current episode started yesterday (2 days). The problem has been unchanged. The cough is Non-productive. Associated symptoms include headaches, myalgias and postnasal drip. Pertinent negatives include no chest pain, chills, ear pain, fever, rash, sore throat or shortness of breath.     Constitution: Positive for fatigue. Negative for chills, sweating and fever.   HENT:  Positive for congestion and postnasal drip. Negative for ear pain and sore throat.    Neck: neck negative.   Cardiovascular: Negative.  Negative for chest pain and palpitations.   Eyes: Negative.    Respiratory:  Positive for cough. Negative for chest tightness, sputum production and shortness of breath.    Gastrointestinal: Negative.  Negative for abdominal pain, nausea, vomiting and diarrhea.   Endocrine: negative.   Genitourinary: Negative.  Negative for dysuria, frequency and urgency.   Musculoskeletal:  Positive for muscle ache.   Skin: Negative.  Negative for color change, pale, rash and erythema.   Allergic/Immunologic: Negative.    Neurological:  Positive for headaches. Negative for dizziness, light-headedness, passing out,  disorientation and altered mental status.   Hematologic/Lymphatic: Negative.    Psychiatric/Behavioral: Negative.  Negative for altered mental status, disorientation and confusion.      Objective:      Physical Exam   Constitutional: She is oriented to person, place, and time. She appears well-developed. She is cooperative.  Non-toxic appearance. She appears ill. No distress. obesity  HENT:   Head: Normocephalic and atraumatic.   Ears:   Right Ear: Hearing, tympanic membrane, external ear and ear canal normal.   Left Ear: Hearing, tympanic membrane, external ear and ear canal normal.   Nose: Congestion present. No mucosal edema, rhinorrhea or nasal deformity. No epistaxis. Right sinus exhibits no maxillary sinus tenderness and no frontal sinus tenderness. Left sinus exhibits no maxillary sinus tenderness and no frontal sinus tenderness.   Mouth/Throat: Uvula is midline, oropharynx is clear and moist and mucous membranes are normal. Mucous membranes are moist. No trismus in the jaw. Normal dentition. No uvula swelling. No oropharyngeal exudate or posterior oropharyngeal erythema. Oropharynx is clear.   Eyes: Conjunctivae and lids are normal. Pupils are equal, round, and reactive to light. Right eye exhibits no discharge. Left eye exhibits no discharge. No scleral icterus.   Neck: Trachea normal and phonation normal. Neck supple.   Cardiovascular: Normal rate, regular rhythm and normal pulses.   Pulmonary/Chest: Effort normal. No respiratory distress. She has no wheezes. She has rhonchi. She has no rales.   Abdominal: Normal appearance and bowel sounds are normal. She exhibits no distension. Soft. There is no abdominal tenderness.   Musculoskeletal: Normal range of motion.         General: Normal range of motion.   Neurological: She is alert and oriented to person, place, and time. She displays weakness (Generalized) and facial asymmetry (Left-sided facial droop; history of Bell's palsy). She exhibits normal muscle  tone.   Skin: Skin is warm, dry, intact, not diaphoretic, not pale and no rash. Capillary refill takes 2 to 3 seconds. No erythema   Psychiatric: Her speech is normal and behavior is normal. Judgment and thought content normal.   Nursing note and vitals reviewed.      Assessment:       1. Cough, unspecified type    2. Bronchitis          Plan:         Cough, unspecified type  -     SARS Coronavirus 2 Antigen, POCT Manual Read  -     POCT Influenza A/B Rapid Antigen  -     XR CHEST PA AND LATERAL; Future; Expected date: 03/20/2023    Bronchitis    Other orders  -     doxycycline (VIBRA-TABS) 100 MG tablet; Take 1 tablet (100 mg total) by mouth 2 (two) times daily. for 10 days  Dispense: 20 tablet; Refill: 0  -     albuterol (VENTOLIN HFA) 90 mcg/actuation inhaler; Inhale 1-2 puffs into the lungs every 6 (six) hours as needed for Wheezing or Shortness of Breath. Rescue  Dispense: 18 g; Refill: 0  -     benzonatate (TESSALON) 200 MG capsule; Take 1 capsule (200 mg total) by mouth 3 (three) times daily as needed for Cough.  Dispense: 30 capsule; Refill: 0                 Labs:  COVID negative.  Influenza a and B negative.  Chest x-ray: The lungs are clear, with normal appearance of pulmonary vasculature and no pleural effusion or pneumothorax. The cardiac silhouette is normal in size. The hilar and mediastinal contours are unremarkable. Bones are intact.  Take medications as prescribed.    Tylenol per package instructions for any pain or fever.    Assure adequate hydration.    Follow-up with PCP in 1-2 days.    Return to clinic as needed.    To ED for any continued, new, or acutely worsening symptoms.    Patient and family both in agreement with plan of care.    DISCLAIMER: Please note that my documentation in this Electronic Healthcare Record was produced using speech recognition software and therefore may contain errors related to that software system.These could include grammar, punctuation and spelling errors or the  inclusion/exclusion of phrases that were not intended. Garbled syntax, mangled pronouns, and other bizarre constructions may be attributed to that software system.

## 2024-08-10 ENCOUNTER — OFFICE VISIT (OUTPATIENT)
Dept: URGENT CARE | Facility: CLINIC | Age: 84
End: 2024-08-10
Payer: MEDICARE

## 2024-08-10 ENCOUNTER — TELEPHONE (OUTPATIENT)
Dept: URGENT CARE | Facility: CLINIC | Age: 84
End: 2024-08-10
Payer: MEDICARE

## 2024-08-10 VITALS
OXYGEN SATURATION: 96 % | DIASTOLIC BLOOD PRESSURE: 91 MMHG | SYSTOLIC BLOOD PRESSURE: 184 MMHG | WEIGHT: 190 LBS | HEIGHT: 62 IN | HEART RATE: 79 BPM | TEMPERATURE: 98 F | RESPIRATION RATE: 20 BRPM | BODY MASS INDEX: 34.96 KG/M2

## 2024-08-10 DIAGNOSIS — E11.22 TYPE 2 DIABETES MELLITUS WITH STAGE 3 CHRONIC KIDNEY DISEASE, WITH LONG-TERM CURRENT USE OF INSULIN: ICD-10-CM

## 2024-08-10 DIAGNOSIS — N18.30 TYPE 2 DIABETES MELLITUS WITH STAGE 3 CHRONIC KIDNEY DISEASE, WITH LONG-TERM CURRENT USE OF INSULIN: ICD-10-CM

## 2024-08-10 DIAGNOSIS — Z79.4 TYPE 2 DIABETES MELLITUS WITH STAGE 3 CHRONIC KIDNEY DISEASE, WITH LONG-TERM CURRENT USE OF INSULIN: ICD-10-CM

## 2024-08-10 PROCEDURE — 99213 OFFICE O/P EST LOW 20 MIN: CPT | Mod: ,,,

## 2024-08-10 RX ORDER — INSULIN GLARGINE 100 [IU]/ML
37 INJECTION, SOLUTION SUBCUTANEOUS DAILY
Qty: 3 EACH | Refills: 0 | Status: SHIPPED | OUTPATIENT
Start: 2024-08-10

## 2024-08-10 NOTE — PATIENT INSTRUCTIONS
Thank you for allowing me to be part of your healthcare team at [facility]. It is a pleasure to care for you today.   Please take all of your medications as instructed and follow all new instructions from your visit today.  If you received labs or medical tests today you should hear information about results or scheduling either by phone or mychart within approximately a week.   If you have any questions or concerns please do not hesitate to call. Have a blessed day.   TALA Werner

## 2024-08-10 NOTE — TELEPHONE ENCOUNTER
Pt son calling requesting insulin rx. Per Mia Mendez NP pt will need to be seen for 30 day supply. Son notified he voiced ok

## 2024-08-10 NOTE — PROGRESS NOTES
"Subjective:      Patient ID: Maryam White is a 83 y.o. female.    Vitals:  height is 5' 2" (1.575 m) and weight is 86.2 kg (190 lb). Her temperature is 98.3 °F (36.8 °C). Her blood pressure is 184/91 (abnormal) and her pulse is 79. Her respiration is 20 and oxygen saturation is 96%.     Chief Complaint: Medication Refill    Patient presents to the clinic for a medication refill on Lantus.     Patient Active Problem List:     Depressive disorder     Essential hypertension     Gastroesophageal reflux disease     Hyperlipidemia     Psoriasis     Type 2 diabetes mellitus with other specified complication     Cognitive impairment     Polyarticular osteoarthritis     Primary insomnia    States she has been out of Lantus since Friday. Was not able to get a refill from her PCP yesterday. She takes 35-37 units daily depending on blood sugar.   Blood sugars elevated around 300. Did not have Lantus on Friday.     Has no other complaints or concerns today.     Patient educated on plan of care, verbalized understanding.       Medication Refill  This is a new problem. The current episode started in the past 7 days (x's 2 days out of meds). Pertinent negatives include no abdominal pain, chest pain, chills, congestion, coughing, diaphoresis, fatigue, fever, headaches, nausea, neck pain, sore throat or vomiting.       Constitution: Negative for appetite change, chills, sweating, fatigue, fever and generalized weakness.   HENT:  Negative for ear pain, congestion, postnasal drip, sinus pain, sinus pressure, sore throat, trouble swallowing and voice change.    Neck: Negative for neck pain, neck stiffness, painful lymph nodes and neck swelling.   Cardiovascular:  Negative for chest pain, leg swelling and palpitations.   Respiratory:  Negative for chest tightness, cough, shortness of breath and wheezing.    Gastrointestinal:  Negative for abdominal pain, nausea, vomiting, constipation and diarrhea.   Genitourinary:  Negative for dysuria, " frequency, urgency and urine decreased.   Skin:  Negative for color change and pale.   Allergic/Immunologic: Negative for chronic cough.   Neurological:  Negative for dizziness, headaches, disorientation and altered mental status.   Hematologic/Lymphatic: Negative for swollen lymph nodes.   Psychiatric/Behavioral:  Negative for altered mental status, disorientation and confusion.       Objective:     Physical Exam   Constitutional: She is oriented to person, place, and time. She appears well-developed.   HENT:   Head: Normocephalic and atraumatic. Head is without abrasion, without contusion and without laceration.   Ears:   Right Ear: External ear normal.   Left Ear: External ear normal.   Nose: Nose normal.   Mouth/Throat: Oropharynx is clear and moist and mucous membranes are normal.   Eyes: Conjunctivae, EOM and lids are normal. Pupils are equal, round, and reactive to light.   Neck: Trachea normal and phonation normal.   Cardiovascular: Normal rate.   Pulmonary/Chest: Effort normal. No stridor. No respiratory distress.   Musculoskeletal: Normal range of motion.         General: Normal range of motion.   Neurological: She is alert and oriented to person, place, and time.   Skin: Skin is warm, dry and intact. Capillary refill takes less than 2 seconds. No abrasion, No burn and No ecchymosis   Psychiatric: Her speech is normal and behavior is normal. Judgment and thought content normal.   Nursing note and vitals reviewed.      Assessment:     1. Type 2 diabetes mellitus with stage 3 chronic kidney disease, with long-term current use of insulin        Plan:       Type 2 diabetes mellitus with stage 3 chronic kidney disease, with long-term current use of insulin  -     LANTUS SOLOSTAR U-100 INSULIN 100 unit/mL (3 mL) InPn pen; Inject 37 Units into the skin once daily.  Dispense: 3 each; Refill: 0      - Follow-up with PCP in 1-2 days.  - Diabetic Diet  - Return to clinic as needed.  - To ED for any new or acutely  worsening symptoms including but not limited to chest pain, palpitations, shortness of breath, or fever greater than 103° F.  Family in agreement with plan of care.

## 2025-02-10 ENCOUNTER — OFFICE VISIT (OUTPATIENT)
Dept: URGENT CARE | Facility: CLINIC | Age: 85
End: 2025-02-10
Payer: MEDICARE

## 2025-02-10 VITALS
BODY MASS INDEX: 34.96 KG/M2 | HEART RATE: 84 BPM | WEIGHT: 190 LBS | RESPIRATION RATE: 18 BRPM | DIASTOLIC BLOOD PRESSURE: 79 MMHG | HEIGHT: 62 IN | OXYGEN SATURATION: 92 % | SYSTOLIC BLOOD PRESSURE: 144 MMHG | TEMPERATURE: 98 F

## 2025-02-10 DIAGNOSIS — R05.9 COUGH, UNSPECIFIED TYPE: Primary | ICD-10-CM

## 2025-02-10 DIAGNOSIS — J22 LOWER RESPIRATORY TRACT INFECTION: ICD-10-CM

## 2025-02-10 LAB
CTP QC/QA: YES
CTP QC/QA: YES
FLUAV AG NPH QL: NEGATIVE
FLUBV AG NPH QL: NEGATIVE
SARS CORONAVIRUS 2 ANTIGEN: NEGATIVE

## 2025-02-10 PROCEDURE — 87804 INFLUENZA ASSAY W/OPTIC: CPT | Mod: QW,,, | Performed by: STUDENT IN AN ORGANIZED HEALTH CARE EDUCATION/TRAINING PROGRAM

## 2025-02-10 PROCEDURE — 99214 OFFICE O/P EST MOD 30 MIN: CPT | Mod: S$GLB,,, | Performed by: STUDENT IN AN ORGANIZED HEALTH CARE EDUCATION/TRAINING PROGRAM

## 2025-02-10 PROCEDURE — 87811 SARS-COV-2 COVID19 W/OPTIC: CPT | Mod: QW,S$GLB,, | Performed by: STUDENT IN AN ORGANIZED HEALTH CARE EDUCATION/TRAINING PROGRAM

## 2025-02-10 RX ORDER — CHLOPHEDIANOL HCL AND PYRILAMINE MALEATE 12.5; 12.5 MG/5ML; MG/5ML
5-10 SOLUTION ORAL
Qty: 118 ML | Refills: 0 | Status: SHIPPED | OUTPATIENT
Start: 2025-02-10

## 2025-02-10 RX ORDER — AZELASTINE 1 MG/ML
2 SPRAY, METERED NASAL 2 TIMES DAILY
Qty: 30 ML | Refills: 0 | Status: SHIPPED | OUTPATIENT
Start: 2025-02-10 | End: 2026-02-10

## 2025-02-10 RX ORDER — ALBUTEROL SULFATE 90 UG/1
1-2 INHALANT RESPIRATORY (INHALATION) EVERY 6 HOURS PRN
Qty: 18 G | Refills: 0 | Status: SHIPPED | OUTPATIENT
Start: 2025-02-10

## 2025-02-10 RX ORDER — AMOXICILLIN AND CLAVULANATE POTASSIUM 875; 125 MG/1; MG/1
1 TABLET, FILM COATED ORAL 2 TIMES DAILY
Qty: 20 TABLET | Refills: 0 | Status: SHIPPED | OUTPATIENT
Start: 2025-02-10 | End: 2025-02-20

## 2025-02-10 NOTE — PROGRESS NOTES
"Subjective:      Patient ID: Maryam White is a 84 y.o. female.    Vitals:  height is 5' 2" (1.575 m) and weight is 86.2 kg (190 lb). Her oral temperature is 98.4 °F (36.9 °C). Her blood pressure is 144/79 (abnormal) and her pulse is 84. Her respiration is 18 and oxygen saturation is 92% (abnormal).     Chief Complaint: Cough    Patient is an 84-year-old female with a past medical history of depression, hypertension, hyperlipidemia, type 2 diabetes, GERD, osteoarthritis, insomnia, and psoriasis who presents to clinic with Tylenol for evaluation of cough and congestion.  Patient reports sick for about 1 week now.  Patient reports she is vaccinated for COVID however not influenza.  Patient reports no recent or known sick exposures.  Patient reports no over-the-counter medicines for symptoms at this point.  Daughter-in-law reports with patient's medical history did not know what to give her.    Cough  This is a new problem. The current episode started 1 to 4 weeks ago (1 1/2 weeks). The problem has been unchanged. The cough is Non-productive. Associated symptoms include myalgias (Chronic arthralgias and myalgias per patient) and postnasal drip. Pertinent negatives include no chest pain, chills, ear pain, fever, headaches, rash, sore throat, shortness of breath or wheezing.       Constitution: Positive for fatigue. Negative for chills, sweating and fever.   HENT:  Positive for congestion and postnasal drip. Negative for ear pain and sore throat.    Neck: neck negative.   Cardiovascular: Negative.  Negative for chest pain and palpitations.   Eyes: Negative.    Respiratory:  Positive for cough. Negative for chest tightness, sputum production, shortness of breath and wheezing.    Gastrointestinal: Negative.  Negative for abdominal pain, nausea, vomiting and diarrhea.   Endocrine: negative.   Genitourinary: Negative.  Negative for dysuria, frequency and urgency.   Musculoskeletal:  Positive for muscle ache (Chronic " arthralgias and myalgias per patient).   Skin: Negative.  Negative for color change, pale, rash and erythema.   Allergic/Immunologic: Negative.    Neurological: Negative.  Negative for dizziness, light-headedness, passing out, headaches, disorientation and altered mental status.   Hematologic/Lymphatic: Negative.    Psychiatric/Behavioral: Negative.  Negative for altered mental status, disorientation and confusion.       Objective:     Physical Exam   Constitutional: She is oriented to person, place, and time. She appears well-developed. She is cooperative.  Non-toxic appearance. She does not appear ill. No distress.   HENT:   Head: Normocephalic and atraumatic.   Ears:   Right Ear: Hearing, tympanic membrane, external ear and ear canal normal.   Left Ear: Hearing, tympanic membrane, external ear and ear canal normal.   Nose: Congestion present. No mucosal edema, rhinorrhea or nasal deformity. No epistaxis. Right sinus exhibits no maxillary sinus tenderness and no frontal sinus tenderness. Left sinus exhibits no maxillary sinus tenderness and no frontal sinus tenderness.   Mouth/Throat: Uvula is midline, oropharynx is clear and moist and mucous membranes are normal. Mucous membranes are moist. No trismus in the jaw. Normal dentition. No uvula swelling. No oropharyngeal exudate or posterior oropharyngeal erythema. Oropharynx is clear.   Eyes: Conjunctivae and lids are normal. Pupils are equal, round, and reactive to light. Right eye exhibits no discharge. Left eye exhibits no discharge. No scleral icterus.   Neck: Trachea normal and phonation normal. Neck supple. No neck rigidity present.   Cardiovascular: Normal rate, regular rhythm and normal pulses.   Pulmonary/Chest: Effort normal. No respiratory distress. She has decreased breath sounds. She has no wheezes. She has no rhonchi. She has no rales.   Abdominal: Normal appearance and bowel sounds are normal. She exhibits no distension. Soft. There is no abdominal  tenderness.   Musculoskeletal: Normal range of motion.         General: Normal range of motion.      Cervical back: She exhibits no tenderness.   Lymphadenopathy:     She has no cervical adenopathy.   Neurological: She is alert and oriented to person, place, and time. She exhibits normal muscle tone.   Skin: Skin is warm, dry, intact, not diaphoretic, not pale and no rash. Capillary refill takes 2 to 3 seconds. No erythema   Psychiatric: Her speech is normal and behavior is normal. Judgment and thought content normal.   Nursing note and vitals reviewed.      Assessment:     1. Cough, unspecified type    2. Lower respiratory tract infection        Plan:       Cough, unspecified type  -     XR CHEST PA AND LATERAL; Future; Expected date: 02/10/2025  -     SARS Coronavirus 2 Antigen, POCT Manual Read  -     POCT Influenza A/B Rapid Antigen    Lower respiratory tract infection    Other orders  -     amoxicillin-clavulanate 875-125mg (AUGMENTIN) 875-125 mg per tablet; Take 1 tablet by mouth 2 (two) times daily. for 10 days  Dispense: 20 tablet; Refill: 0  -     albuterol (VENTOLIN HFA) 90 mcg/actuation inhaler; Inhale 1-2 puffs into the lungs every 6 (six) hours as needed for Shortness of Breath or Wheezing. Rescue  Dispense: 18 g; Refill: 0  -     pyrilamine-chlophedianoL (NINJACOF) 12.5-12.5 mg/5 mL Liqd; Take 5-10 mLs by mouth every 6 to 8 hours as needed (Cough).  Dispense: 118 mL; Refill: 0  -     azelastine (ASTELIN) 137 mcg (0.1 %) nasal spray; 2 sprays (274 mcg total) by Nasal route 2 (two) times daily.  Dispense: 30 mL; Refill: 0                Patient and daughter-in-law initially refused point of care testing and chest x-ray however later agreed to do both COVID and flu along with chest x-ray.  Labs:  Influenza a and B negative.  COVID negative.  Chest x-ray:  Interpreted by myself without evidence of acute pulmonary process.  SpO2 ranging from 91-93% on room air.  Take medications as prescribed.  Tylenol per  package instructions for any pain or fever.  Assure adequate hydration and rest.  Throat lozenges or Chloraseptic per package instructions for sore throat.    Warm salt water gargles every 2-3 hours as needed for sore throat.    Nasal saline flushes or irrigation as directed for nasal saline congestion and sinus related symptoms.  Follow-up with PCP in 1-2 days.  Return to clinic as needed.  To ED for any new or acutely worsening symptoms.  Patient in agreement with plan of care.    DISCLAIMER: Please note that my documentation in this Electronic Healthcare Record was produced using speech recognition software and therefore may contain errors related to that software system.These could include grammar, punctuation and spelling errors or the inclusion/exclusion of phrases that were not intended. Garbled syntax, mangled pronouns, and other bizarre constructions may be attributed to that software system.

## 2025-03-23 ENCOUNTER — OFFICE VISIT (OUTPATIENT)
Dept: URGENT CARE | Facility: CLINIC | Age: 85
End: 2025-03-23
Payer: MEDICARE

## 2025-03-23 VITALS
HEIGHT: 62 IN | SYSTOLIC BLOOD PRESSURE: 173 MMHG | OXYGEN SATURATION: 99 % | BODY MASS INDEX: 34.96 KG/M2 | WEIGHT: 190 LBS | HEART RATE: 97 BPM | DIASTOLIC BLOOD PRESSURE: 82 MMHG | RESPIRATION RATE: 18 BRPM | TEMPERATURE: 99 F

## 2025-03-23 DIAGNOSIS — R07.81 RIB PAIN ON LEFT SIDE: ICD-10-CM

## 2025-03-23 DIAGNOSIS — R06.02 SHORTNESS OF BREATH: ICD-10-CM

## 2025-03-23 DIAGNOSIS — J90 PLEURAL EFFUSION: ICD-10-CM
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
## 2025-03-23 DIAGNOSIS — S22.42XA CLOSED FRACTURE OF MULTIPLE RIBS OF LEFT SIDE, INITIAL ENCOUNTER: ICD-10-CM

## 2025-03-23 NOTE — PROGRESS NOTES
"Subjective:      Patient ID: Maryam White is a 84 y.o. female.    Vitals:  height is 5' 2" (1.575 m) and weight is 86.2 kg (190 lb). Her temperature is 98.5 °F (36.9 °C). Her blood pressure is 173/82 (abnormal) and her pulse is 97. Her respiration is 18 and oxygen saturation is 99%.     Chief Complaint: Fall and Rib Injury    Patient presents to the clinic with complaint of left rib pain post fall.     Patient Active Problem List:     Depressive disorder     Essential hypertension     Gastroesophageal reflux disease     Hyperlipidemia     Psoriasis     Type 2 diabetes mellitus with other specified complication     Cognitive impairment     Polyarticular osteoarthritis     Primary insomnia    States she fell 3 days ago. Has had left sided rib pain ever since. She also reports shortness of breath since the fall. States pain is severe. Has only been taking Tylenol.     Due to current symptoms patient was sent to ER for further evaluation. Family declines ambulance transport and they are taking her to [facility]. [facility] called and report given to Edmar.                  Constitution: Negative for appetite change, chills, sweating, fatigue, fever and generalized weakness.   HENT:  Negative for ear pain, congestion, postnasal drip, sinus pain, sinus pressure, sore throat, trouble swallowing and voice change.    Neck: Negative for neck pain, neck stiffness, painful lymph nodes and neck swelling.   Cardiovascular:  Negative for chest pain, leg swelling and palpitations.   Respiratory:  Positive for shortness of breath. Negative for chest tightness, cough and wheezing.    Gastrointestinal:  Negative for abdominal pain, nausea, vomiting, constipation and diarrhea.   Genitourinary:  Negative for dysuria, frequency, urgency and urine decreased.   Musculoskeletal:  Positive for pain and trauma.   Skin:  Negative for color change and pale.   Allergic/Immunologic: Negative for chronic cough.   Neurological:  Negative for " dizziness, headaches, disorientation and altered mental status.   Hematologic/Lymphatic: Negative for swollen lymph nodes.   Psychiatric/Behavioral:  Negative for altered mental status, disorientation and confusion.       Objective:     Physical Exam   Constitutional: She is oriented to person, place, and time. She appears well-developed. She is cooperative.  Non-toxic appearance. She does not appear ill. No distress.      Comments:Visibly in pain, in wheelchair during visit     HENT:   Head: Normocephalic and atraumatic.   Ears:   Right Ear: Hearing and external ear normal.   Left Ear: Hearing and external ear normal.   Nose: Nose normal. No mucosal edema, rhinorrhea or nasal deformity. No epistaxis. Right sinus exhibits no maxillary sinus tenderness and no frontal sinus tenderness. Left sinus exhibits no maxillary sinus tenderness and no frontal sinus tenderness.   Mouth/Throat: Uvula is midline, oropharynx is clear and moist and mucous membranes are normal. No trismus in the jaw. Normal dentition. No uvula swelling. No posterior oropharyngeal edema.   Eyes: Conjunctivae and lids are normal. No scleral icterus.   Neck: Trachea normal and phonation normal. Neck supple. No edema present. No erythema present. No neck rigidity present.   Cardiovascular: Normal rate, regular rhythm, normal heart sounds and normal pulses.   Pulmonary/Chest: Effort normal. No respiratory distress. She has decreased breath sounds in the left lower field. She has no rhonchi.   Abdominal: Normal appearance.   Musculoskeletal: Normal range of motion.         General: No deformity. Normal range of motion.   Neurological: She is alert and oriented to person, place, and time. She displays weakness. She exhibits normal muscle tone. Gait abnormal. Coordination normal.   Skin: Skin is warm, dry, intact, not diaphoretic and not pale.   Psychiatric: Her speech is normal and behavior is normal. Judgment and thought content normal.   Nursing note and  vitals reviewed.      Assessment:     1. Fall, initial encounter    2. Rib pain on left side    3. Shortness of breath    4. Closed fracture of multiple ribs of left side, initial encounter    5. Pleural effusion        Plan:       Fall, initial encounter  -     XR RIB LEFT W/ PA CHEST; Future; Expected date: 03/23/2025    Rib pain on left side    Shortness of breath    Closed fracture of multiple ribs of left side, initial encounter    Pleural effusion      X-ray report-   Acute fractures of the left 6th, 7th and 8th ribs. Small left pleural effusion or hemothorax and atelectasis at the left lung base with hypoventilation.     Due to current symptoms patient was send to ER for further evaluation. Family declines ambulance transport and they are taking her to [facility]. [facility] called and report given to Edmar.

## 2025-07-27 ENCOUNTER — HOSPITAL ENCOUNTER (INPATIENT)
Facility: HOSPITAL | Age: 85
LOS: 4 days | Discharge: HOME HEALTH | DRG: 439 | End: 2025-07-31
Attending: INTERNAL MEDICINE
Payer: MEDICARE

## 2025-07-27 DIAGNOSIS — K85.90 PANCREATITIS: ICD-10-CM

## 2025-07-27 DIAGNOSIS — R07.9 CHEST PAIN: ICD-10-CM

## 2025-07-27 DIAGNOSIS — R09.02 HYPOXIA: ICD-10-CM

## 2025-07-27 DIAGNOSIS — K80.50 CHOLEDOCHOLITHIASIS: Primary | ICD-10-CM

## 2025-07-27 PROBLEM — E87.20 LACTIC ACIDOSIS: Status: ACTIVE | Noted: 2025-07-27

## 2025-07-27 LAB
ALBUMIN SERPL-MCNC: 3.2 G/DL (ref 3.5–5.2)
ALP SERPL-CCNC: 379 UNIT/L (ref 55–135)
ALT SERPL-CCNC: 371 UNIT/L (ref 10–44)
ANION GAP (SMH): 4 MMOL/L (ref 8–16)
AST SERPL-CCNC: 275 UNIT/L (ref 10–40)
BILIRUB SERPL-MCNC: 1.5 MG/DL (ref 0.1–1)
BUN SERPL-MCNC: 16 MG/DL (ref 8–23)
CALCIUM SERPL-MCNC: 8.6 MG/DL (ref 8.7–10.5)
CHLORIDE SERPL-SCNC: 104 MMOL/L (ref 95–110)
CO2 SERPL-SCNC: 27 MMOL/L (ref 23–29)
CREAT SERPL-MCNC: 1.2 MG/DL (ref 0.5–1.4)
EAG (SMH): 206 MG/DL (ref 68–131)
ERYTHROCYTE [DISTWIDTH] IN BLOOD BY AUTOMATED COUNT: 13 % (ref 11.5–14.5)
GFR SERPLBLD CREATININE-BSD FMLA CKD-EPI: 44 ML/MIN/1.73/M2
GLUCOSE SERPL-MCNC: 134 MG/DL (ref 70–110)
HBA1C MFR BLD: 8.8 % (ref 4.5–6.2)
HCT VFR BLD AUTO: 37.2 % (ref 37–48.5)
HGB BLD-MCNC: 12.2 GM/DL (ref 12–16)
LACTATE SERPL-SCNC: 1.1 MMOL/L (ref 0.5–1.9)
LACTATE SERPL-SCNC: 2.1 MMOL/L (ref 0.5–1.9)
LACTATE SERPL-SCNC: 2.2 MMOL/L (ref 0.5–1.9)
LYMPHOCYTES NFR BLD MANUAL: 3 % (ref 18–48)
MAGNESIUM SERPL-MCNC: 1.6 MG/DL (ref 1.6–2.6)
MCH RBC QN AUTO: 30.4 PG (ref 27–31)
MCHC RBC AUTO-ENTMCNC: 32.8 G/DL (ref 32–36)
MCV RBC AUTO: 93 FL (ref 82–98)
MONOCYTES NFR BLD MANUAL: 2 % (ref 4–15)
NEUTROPHILS NFR BLD MANUAL: 78 % (ref 38–73)
NEUTS BAND NFR BLD MANUAL: 17 %
NUCLEATED RBC (/100WBC) (SMH): 0 /100 WBC
PLATELET # BLD AUTO: 175 K/UL (ref 150–450)
PLATELET BLD QL SMEAR: ABNORMAL
PMV BLD AUTO: 9.3 FL (ref 9.2–12.9)
POCT GLUCOSE: 169 MG/DL (ref 70–110)
POCT GLUCOSE: 99 MG/DL (ref 70–110)
POTASSIUM SERPL-SCNC: 4.2 MMOL/L (ref 3.5–5.1)
PROT SERPL-MCNC: 5.7 GM/DL (ref 6–8.4)
RBC # BLD AUTO: 4.01 M/UL (ref 4–5.4)
SODIUM SERPL-SCNC: 135 MMOL/L (ref 136–145)
WBC # BLD AUTO: 22.22 K/UL (ref 3.9–12.7)

## 2025-07-27 PROCEDURE — 25000003 PHARM REV CODE 250

## 2025-07-27 PROCEDURE — 83735 ASSAY OF MAGNESIUM: CPT

## 2025-07-27 PROCEDURE — 99900035 HC TECH TIME PER 15 MIN (STAT)

## 2025-07-27 PROCEDURE — 63600175 PHARM REV CODE 636 W HCPCS

## 2025-07-27 PROCEDURE — 27000221 HC OXYGEN, UP TO 24 HOURS

## 2025-07-27 PROCEDURE — 94761 N-INVAS EAR/PLS OXIMETRY MLT: CPT

## 2025-07-27 PROCEDURE — 80053 COMPREHEN METABOLIC PANEL: CPT

## 2025-07-27 PROCEDURE — 20600001 HC STEP DOWN PRIVATE ROOM

## 2025-07-27 PROCEDURE — 85025 COMPLETE CBC W/AUTO DIFF WBC: CPT

## 2025-07-27 PROCEDURE — 83605 ASSAY OF LACTIC ACID: CPT

## 2025-07-27 PROCEDURE — 99900031 HC PATIENT EDUCATION (STAT)

## 2025-07-27 PROCEDURE — 63600175 PHARM REV CODE 636 W HCPCS: Mod: JZ | Performed by: INTERNAL MEDICINE

## 2025-07-27 PROCEDURE — 36415 COLL VENOUS BLD VENIPUNCTURE: CPT

## 2025-07-27 PROCEDURE — 83036 HEMOGLOBIN GLYCOSYLATED A1C: CPT

## 2025-07-27 PROCEDURE — 94799 UNLISTED PULMONARY SVC/PX: CPT

## 2025-07-27 RX ORDER — GLUCAGON 1 MG
1 KIT INJECTION
Status: DISCONTINUED | OUTPATIENT
Start: 2025-07-27 | End: 2025-07-31 | Stop reason: HOSPADM

## 2025-07-27 RX ORDER — METRONIDAZOLE 500 MG/100ML
500 INJECTION, SOLUTION INTRAVENOUS
Status: DISCONTINUED | OUTPATIENT
Start: 2025-07-27 | End: 2025-07-31 | Stop reason: HOSPADM

## 2025-07-27 RX ORDER — IBUPROFEN 200 MG
24 TABLET ORAL
Status: DISCONTINUED | OUTPATIENT
Start: 2025-07-27 | End: 2025-07-31 | Stop reason: HOSPADM

## 2025-07-27 RX ORDER — TRAZODONE HYDROCHLORIDE 50 MG/1
50 TABLET ORAL NIGHTLY
Status: DISCONTINUED | OUTPATIENT
Start: 2025-07-27 | End: 2025-07-31 | Stop reason: HOSPADM

## 2025-07-27 RX ORDER — CEFTRIAXONE 2 G/1
2 INJECTION, POWDER, FOR SOLUTION INTRAMUSCULAR; INTRAVENOUS
Status: DISCONTINUED | OUTPATIENT
Start: 2025-07-27 | End: 2025-07-31 | Stop reason: HOSPADM

## 2025-07-27 RX ORDER — ONDANSETRON HYDROCHLORIDE 2 MG/ML
4 INJECTION, SOLUTION INTRAVENOUS EVERY 6 HOURS PRN
Status: DISCONTINUED | OUTPATIENT
Start: 2025-07-27 | End: 2025-07-31 | Stop reason: HOSPADM

## 2025-07-27 RX ORDER — ALBUTEROL SULFATE 90 UG/1
2 INHALANT RESPIRATORY (INHALATION) EVERY 6 HOURS PRN
Status: DISCONTINUED | OUTPATIENT
Start: 2025-07-27 | End: 2025-07-27

## 2025-07-27 RX ORDER — ACETAMINOPHEN 325 MG/1
650 TABLET ORAL EVERY 4 HOURS PRN
Status: DISCONTINUED | OUTPATIENT
Start: 2025-07-27 | End: 2025-07-31 | Stop reason: HOSPADM

## 2025-07-27 RX ORDER — SODIUM,POTASSIUM PHOSPHATES 280-250MG
2 POWDER IN PACKET (EA) ORAL
Status: DISCONTINUED | OUTPATIENT
Start: 2025-07-27 | End: 2025-07-31 | Stop reason: HOSPADM

## 2025-07-27 RX ORDER — INSULIN GLARGINE 100 [IU]/ML
10 INJECTION, SOLUTION SUBCUTANEOUS NIGHTLY
Status: DISCONTINUED | OUTPATIENT
Start: 2025-07-27 | End: 2025-07-31 | Stop reason: HOSPADM

## 2025-07-27 RX ORDER — PANTOPRAZOLE SODIUM 40 MG/1
40 TABLET, DELAYED RELEASE ORAL DAILY
Status: DISCONTINUED | OUTPATIENT
Start: 2025-07-27 | End: 2025-07-31 | Stop reason: HOSPADM

## 2025-07-27 RX ORDER — LANOLIN ALCOHOL/MO/W.PET/CERES
800 CREAM (GRAM) TOPICAL
Status: DISCONTINUED | OUTPATIENT
Start: 2025-07-27 | End: 2025-07-31 | Stop reason: HOSPADM

## 2025-07-27 RX ORDER — NALOXONE HCL 0.4 MG/ML
0.02 VIAL (ML) INJECTION
Status: DISCONTINUED | OUTPATIENT
Start: 2025-07-27 | End: 2025-07-31 | Stop reason: HOSPADM

## 2025-07-27 RX ORDER — TRIAMCINOLONE ACETONIDE 1 MG/G
CREAM TOPICAL 2 TIMES DAILY
Status: DISCONTINUED | OUTPATIENT
Start: 2025-07-27 | End: 2025-07-31 | Stop reason: HOSPADM

## 2025-07-27 RX ORDER — MUPIROCIN 20 MG/G
OINTMENT TOPICAL 2 TIMES DAILY
Status: DISCONTINUED | OUTPATIENT
Start: 2025-07-27 | End: 2025-07-31 | Stop reason: HOSPADM

## 2025-07-27 RX ORDER — IBUPROFEN 200 MG
16 TABLET ORAL
Status: DISCONTINUED | OUTPATIENT
Start: 2025-07-27 | End: 2025-07-31 | Stop reason: HOSPADM

## 2025-07-27 RX ORDER — ALBUTEROL SULFATE 0.83 MG/ML
2.5 SOLUTION RESPIRATORY (INHALATION) EVERY 6 HOURS PRN
Status: DISCONTINUED | OUTPATIENT
Start: 2025-07-27 | End: 2025-07-31 | Stop reason: HOSPADM

## 2025-07-27 RX ORDER — HYDROMORPHONE HYDROCHLORIDE 1 MG/ML
1 INJECTION, SOLUTION INTRAMUSCULAR; INTRAVENOUS; SUBCUTANEOUS
Refills: 0 | Status: DISCONTINUED | OUTPATIENT
Start: 2025-07-28 | End: 2025-07-31 | Stop reason: HOSPADM

## 2025-07-27 RX ORDER — DULOXETIN HYDROCHLORIDE 30 MG/1
60 CAPSULE, DELAYED RELEASE ORAL DAILY
Status: DISCONTINUED | OUTPATIENT
Start: 2025-07-27 | End: 2025-07-31 | Stop reason: HOSPADM

## 2025-07-27 RX ORDER — SODIUM CHLORIDE, SODIUM LACTATE, POTASSIUM CHLORIDE, CALCIUM CHLORIDE 600; 310; 30; 20 MG/100ML; MG/100ML; MG/100ML; MG/100ML
INJECTION, SOLUTION INTRAVENOUS CONTINUOUS
Status: DISCONTINUED | OUTPATIENT
Start: 2025-07-27 | End: 2025-07-30

## 2025-07-27 RX ORDER — SODIUM CHLORIDE 0.9 % (FLUSH) 0.9 %
10 SYRINGE (ML) INJECTION EVERY 12 HOURS PRN
Status: DISCONTINUED | OUTPATIENT
Start: 2025-07-27 | End: 2025-07-31 | Stop reason: HOSPADM

## 2025-07-27 RX ORDER — AMOXICILLIN 250 MG
1 CAPSULE ORAL DAILY PRN
Status: DISCONTINUED | OUTPATIENT
Start: 2025-07-27 | End: 2025-07-31 | Stop reason: HOSPADM

## 2025-07-27 RX ORDER — ACETAMINOPHEN 325 MG/1
650 TABLET ORAL EVERY 8 HOURS PRN
Status: DISCONTINUED | OUTPATIENT
Start: 2025-07-27 | End: 2025-07-31 | Stop reason: HOSPADM

## 2025-07-27 RX ORDER — ALUMINUM HYDROXIDE, MAGNESIUM HYDROXIDE, AND SIMETHICONE 1200; 120; 1200 MG/30ML; MG/30ML; MG/30ML
30 SUSPENSION ORAL 4 TIMES DAILY PRN
Status: DISCONTINUED | OUTPATIENT
Start: 2025-07-27 | End: 2025-07-31 | Stop reason: HOSPADM

## 2025-07-27 RX ORDER — INSULIN ASPART 100 [IU]/ML
0-5 INJECTION, SOLUTION INTRAVENOUS; SUBCUTANEOUS EVERY 6 HOURS PRN
Status: DISCONTINUED | OUTPATIENT
Start: 2025-07-27 | End: 2025-07-31 | Stop reason: HOSPADM

## 2025-07-27 RX ORDER — HYDROCODONE BITARTRATE AND ACETAMINOPHEN 5; 325 MG/1; MG/1
1 TABLET ORAL EVERY 6 HOURS PRN
Refills: 0 | Status: DISCONTINUED | OUTPATIENT
Start: 2025-07-27 | End: 2025-07-31 | Stop reason: HOSPADM

## 2025-07-27 RX ORDER — TALC
6 POWDER (GRAM) TOPICAL NIGHTLY PRN
Status: DISCONTINUED | OUTPATIENT
Start: 2025-07-27 | End: 2025-07-31 | Stop reason: HOSPADM

## 2025-07-27 RX ADMIN — Medication 800 MG: at 09:07

## 2025-07-27 RX ADMIN — MUPIROCIN 1 G: 20 OINTMENT TOPICAL at 11:07

## 2025-07-27 RX ADMIN — TRIAMCINOLONE ACETONIDE: 1 CREAM TOPICAL at 09:07

## 2025-07-27 RX ADMIN — SODIUM CHLORIDE, SODIUM LACTATE, POTASSIUM CHLORIDE, AND CALCIUM CHLORIDE: 600; 310; 30; 20 INJECTION, SOLUTION INTRAVENOUS at 09:07

## 2025-07-27 RX ADMIN — METRONIDAZOLE 500 MG: 5 INJECTION, SOLUTION INTRAVENOUS at 05:07

## 2025-07-27 RX ADMIN — CEFTRIAXONE 2 G: 2 INJECTION, POWDER, FOR SOLUTION INTRAMUSCULAR; INTRAVENOUS at 02:07

## 2025-07-27 RX ADMIN — TRIAMCINOLONE ACETONIDE: 1 CREAM TOPICAL at 11:07

## 2025-07-27 RX ADMIN — HYDROMORPHONE HYDROCHLORIDE 1 MG: 1 INJECTION, SOLUTION INTRAMUSCULAR; INTRAVENOUS; SUBCUTANEOUS at 11:07

## 2025-07-27 RX ADMIN — HYDROCODONE BITARTRATE AND ACETAMINOPHEN 1 TABLET: 5; 325 TABLET ORAL at 10:07

## 2025-07-27 RX ADMIN — MUPIROCIN 1 G: 20 OINTMENT TOPICAL at 09:07

## 2025-07-27 RX ADMIN — PANTOPRAZOLE SODIUM 40 MG: 40 TABLET, DELAYED RELEASE ORAL at 05:07

## 2025-07-27 RX ADMIN — INSULIN GLARGINE 10 UNITS: 100 INJECTION, SOLUTION SUBCUTANEOUS at 09:07

## 2025-07-27 RX ADMIN — Medication 800 MG: at 05:07

## 2025-07-27 RX ADMIN — TRAZODONE HYDROCHLORIDE 50 MG: 50 TABLET ORAL at 09:07

## 2025-07-27 NOTE — ASSESSMENT & PLAN NOTE
CC: PAMELLA  HPI: 11yo Patient's blood pressure range in the last 24 hours was: BP  Min: 98/62  Max: 117/61.The patient's inpatient anti-hypertensive regimen is listed below:  Current Antihypertensives       Plan  - Hold

## 2025-07-27 NOTE — PLAN OF CARE
Problem: Adult Inpatient Plan of Care  Goal: Absence of Hospital-Acquired Illness or Injury  Outcome: Progressing   Resting mostly throughout the day. Confused to place and time (hx Dementia). Repeats self. Respirations unlabored.  O2 2-4L NC. Abd distended, soft. Denies any pain or tenderness. Rodríguez intact with good UOP.  BP soft at times. LA remains elevated, trending. IVF's adjusted. Multiple areas of excoriated appearing skin. Cleaned and cream/antifungal powder applied. Pic's placed on chart. Wound care consulted.   MRI/MRCP done.  Plans for ERCP on 7/29 per GI.

## 2025-07-27 NOTE — ASSESSMENT & PLAN NOTE
- Insulin glargine and sliding scale adjust based on BG and diet tolerance  - POCT glucose  - A1C

## 2025-07-27 NOTE — PROGRESS NOTES
Automatic Inhaler to Nebulizer Interchange    albuterol (Ventolin, ProAir, or Proventil)  mcg given multiple times per day changed to albuterol 2.5 mg Q6H prn wheezing, shortness of breath per [facility] Automatic Therapeutic Substitutions Protocol.    Please contact pharmacy at extension [phone] with any questions.     Thank you,   Kiarra Strange

## 2025-07-27 NOTE — CONSULTS
GASTROENTEROLOGY INPATIENT CONSULT NOTE  Patient Name: Maryam White  Patient MRN: [id number]  Patient : 1940    Admit Date: 2025  Service date: 2025    Reason for Consult: acute pancreatitis    PCP: Brianna, Primary Doctor    HPI:   Most of history from chart review and discussion with family    Patient is a 84 y.o. female with PMHx dementia, HTN, HLD, DMII, GERD and prior cholecystectomy with 2 different ERCP's after (most recently in 2023 where she had multiple stones in CBD) who presented to [place] ED with abdominal pain and rigors. Son says she has been having shakes like she did previously when she was told she had pancreatitis. Denies she has been complaining of the same pain as last time. No bleeding or melena seen. No jaundice or itching noted.    Past Medical History:  Past Medical History:   Diagnosis Date    Bell's palsy     Cataract     Depression     Diabetes mellitus, type 2     30+    Hypertension     Pancreatitis     x 3    Psoriasis     Skin cancer     Uses walker         Past Surgical History:  Past Surgical History:   Procedure Laterality Date    APPENDECTOMY       SECTION       SECTION      COLONOSCOPY      ERCP      ERCP N/A 2023    Procedure: ERCP (ENDOSCOPIC RETROGRADE CHOLANGIOPANCREATOGRAPHY);  Surgeon: Rambo Rutledge III, MD;  Location: [facility];  Service: Endoscopy;  Laterality: N/A;    GALLBLADDER SURGERY      SKIN CANCER EXCISION      TONSILLECTOMY          Home Medications:  Prescriptions Prior to Admission[1]    Inpatient Medications:   cefTRIAXone (Rocephin) IV (PEDS and ADULTS)  2 g Intravenous Q24H    DULoxetine  60 mg Oral Daily    insulin glargine U-100  10 Units Subcutaneous QHS    mupirocin   Nasal BID    pantoprazole  40 mg Oral Daily    traZODone  50 mg Oral QHS    triamcinolone acetonide 0.1%   Topical (Top) BID       Current Facility-Administered Medications:     acetaminophen, 650 mg, Oral, Q8H PRN    acetaminophen, 650 mg,  Oral, Q4H PRN    albuterol sulfate, 2.5 mg, Nebulization, Q6H PRN    aluminum-magnesium hydroxide-simethicone, 30 mL, Oral, QID PRN    dextrose 50%, 12.5 g, Intravenous, PRN    dextrose 50%, 12.5 g, Intravenous, PRN    dextrose 50%, 25 g, Intravenous, PRN    glucagon (human recombinant), 1 mg, Intramuscular, PRN    glucagon (human recombinant), 1 mg, Intramuscular, PRN    glucose, 16 g, Oral, PRN    glucose, 24 g, Oral, PRN    HYDROcodone-acetaminophen, 1 tablet, Oral, Q6H PRN    insulin aspart U-100, 0-5 Units, Subcutaneous, Q6H PRN    magnesium oxide, 800 mg, Oral, PRN    magnesium oxide, 800 mg, Oral, PRN    melatonin, 6 mg, Oral, Nightly PRN    naloxone, 0.02 mg, Intravenous, PRN    ondansetron, 4 mg, Intravenous, Q6H PRN    potassium bicarbonate, 35 mEq, Oral, PRN    potassium bicarbonate, 50 mEq, Oral, PRN    potassium bicarbonate, 60 mEq, Oral, PRN    potassium, sodium phosphates, 2 packet, Oral, PRN    potassium, sodium phosphates, 2 packet, Oral, PRN    potassium, sodium phosphates, 2 packet, Oral, PRN    senna-docusate, 1 tablet, Oral, Daily PRN    sodium chloride 0.9%, 10 mL, Intravenous, Q12H PRN    Review of patient's allergies indicates:   Allergen Reactions    Ibuprofen      GEMA    Metformin hcl      Other reaction(s): GEMA    Naproxen sodium      Other reaction(s): GEMA       Social History:   Social History     Occupational History    Not on file   Tobacco Use    Smoking status: Never    Smokeless tobacco: Never   Substance and Sexual Activity    Alcohol use: Never    Drug use: Never    Sexual activity: Not on file       Family History:   No known GI malignancy    Review of Systems:  Review of Systems   Constitutional:  Positive for chills and malaise/fatigue. Negative for fever.   HENT:  Negative for nosebleeds and sore throat.    Eyes:  Negative for pain and redness.   Respiratory:  Negative for shortness of breath and wheezing.    Cardiovascular:  Negative for chest pain and leg swelling.  "  Gastrointestinal:  Negative for abdominal pain, blood in stool, constipation, diarrhea, melena, nausea and vomiting.   Genitourinary:  Negative for dysuria and flank pain.   Musculoskeletal:  Negative for falls and myalgias.   Skin:  Negative for itching and rash.   Neurological:  Negative for focal weakness and loss of consciousness.       OBJECTIVE:    Physical Exam:  24 Hour Vital Sign Ranges: Temp:  [98.3 °F (36.8 °C)] 98.3 °F (36.8 °C)  Pulse:  [76-92] 76  Resp:  [15-22] 16  SpO2:  [89 %-98 %] 98 %  BP: ()/(55-64) 108/55  Most recent vitals: BP (!) 108/55   Pulse 76   Temp 98.3 °F (36.8 °C) (Oral)   Resp 16   LMP  (LMP Unknown)   SpO2 98%    CONSTITUTIONAL: laying in bed, NAD, chronically ill appearing  Eyes: PERRL, anicteric conjunctivae  ENT: nares patent, oral mucosa pink and moist without lesion  NECK: trachea midline; Good ROM  CV: regular rate and rhythm, no murmurs or gallops  RESP: clear to auscultation bilaterally, no wheezes, rhonci or rales  ABD: soft, non-tender, non-distended, normal bowel sounds  MSK: no swelling or edema, 2+ pulses distally  INTEGUMENT: warm/dry, no rashes or jaundice on limited skin exam  NEURO: appropriately conversant, dementia  PSYCH: normal affect    Labs:   I have personally reviewed the pertinent/available labs in [facility].     Recent Labs     07/27/25  0956   WBC 22.22*   MCV 93        Recent Labs     07/27/25  0956   *   K 4.2      CO2 27   BUN 16   *     No results for input(s): "ALB" in the last 72 hours.    Invalid input(s): "ALKP", "SGOT", "SGPT", "TBIL", "DBIL", "TPRO"  No results for input(s): "PT", "INR", "PTT" in the last 72 hours.      Radiology Review:  I have personally reviewed the recent available imaging in [facility].    MRI MRCP Abdomen WO Contrast 3D WO Independent WS XPD    (Results Pending)       Endoscopy:  I have personally reviewed and interpreted the reports and images from the endoscopy reports available in " Epic.    ERCP 2023: choledocholithiais removed. Post-cholecystectomy    IMPRESSION / RECOMMENDATIONS:  Rigors  Elevated LFTs  - outside CT without pancreatitis changes and lipase 233. No characteristic pain. Does not meet criteria for pancreatitis. Lipase elevation likely 2/2 gastritis vs another process  - LFTs improving but elevated compared to previous labs checked in March at OSH (reviewed in Care Everywhere)  - MRCP witch choledocholithiasis  - plan for ERCP tomorrow or Tuesday pending physician availability with Dr Bell/Dr Rutledge. Keep NPO at midnight for tentative procedure tomorrow. Can have CLD until then.  - increased risk factors include acute and chronic hypoxic respiratory failure, obesity, DM2, advanced age    Plan discussed with consulting physician Dr Dfufy by secure chat.    Thank you for this consult.    Kameron Chapman  7/27/2025  1:29 PM         [1]   Medications Prior to Admission   Medication Sig Dispense Refill Last Dose/Taking    albuterol (VENTOLIN HFA) 90 mcg/actuation inhaler Inhale 1-2 puffs into the lungs every 6 (six) hours as needed for Shortness of Breath or Wheezing. Rescue 18 g 0     aspirin (ECOTRIN) 81 MG EC tablet Take 81 mg by mouth once daily.       atorvastatin (LIPITOR) 40 MG tablet Take 1 tablet (40 mg total) by mouth once daily. 90 tablet 1     azelastine (ASTELIN) 137 mcg (0.1 %) nasal spray 2 sprays (274 mcg total) by Nasal route 2 (two) times daily. 30 mL 0     DULoxetine (CYMBALTA) 60 MG capsule Take 1 capsule (60 mg total) by mouth once daily. 90 capsule 1     glipiZIDE (GLUCOTROL) 5 MG tablet Take 1 tablet (5 mg total) by mouth 3 (three) times daily. (Patient taking differently: Take 5 mg by mouth 2 (two) times daily with meals.) 270 tablet 1     lancets (BD ULTRA FINE LANCETS) 33 gauge Misc 1 lancet by Misc.(Non-Drug; Combo Route) route 2 (two) times daily as needed. 100 each 1     LANTUS SOLOSTAR U-100 INSULIN 100 unit/mL (3 mL) InPn pen Inject 37 Units into  the skin once daily. 3 each 0     losartan (COZAAR) 100 MG tablet TK 1 T PO  D FOR BLOOD PRESSURE 90 tablet 1     pyrilamine-chlophedianoL (NINJACOF) 12.5-12.5 mg/5 mL Liqd Take 5-10 mLs by mouth every 6 to 8 hours as needed (Cough). 118 mL 0     traZODone (DESYREL) 50 MG tablet TK ONE T PO QHS PRN (Patient taking differently: Take 50 mg by mouth every evening. TK ONE T PO QHS PRN) 90 tablet 1     triamcinolone acetonide 0.025% (KENALOG) 0.025 % cream Apply topically 2 (two) times daily. Breast area - folds of the skin

## 2025-07-27 NOTE — PLAN OF CARE
[facility]  Initial Discharge Assessment     Assessment was completed at bedside with Pt son and daughter in law.  Cm verified demographic, insurance and pharmacy. Pt denies any in home services, dialysis or coumadin clinics. Pt has a walker, rollator and shower chair at home that she uses. Pt has oxygen equipment at home as well but is not using it currently. Pt son Kofi is her POA.  Pt plans to discharge home with home health. Son and Daughter will transport her home.      Primary Care Provider: No, Primary Doctor    Admission Diagnosis: Pancreatitis [K85.90]  Elevated liver enzymes [R74.8]  Hyperbilirubinemia [E80.6]    Admission Date: 7/27/2025  Expected Discharge Date:          Payor: MEDICARE / Plan: MEDICARE PART A & B / Product Type: Government /     Extended Emergency Contact Information  Primary Emergency Contact: Kofi Bauer  Mobile Phone: [phone]  Relation: Other  Preferred language: English   needed? No    Discharge Plan A: (P) Home Health  Discharge Plan B: (P) Home with family      [id number] - [place], [place] - [address] [facility] [address]  [address]  [place] [address]  Phone: [phone] Fax: [phone]    [id number] - [place], [place] - [address]  [address]  [place] [address]  Phone: [phone] Fax: [phone]      Initial Assessment (most recent)       Adult Discharge Assessment - 07/27/25 1612          Discharge Assessment    Assessment Type Discharge Planning Assessment (P)      Confirmed/corrected address, phone number and insurance Yes (P)      Confirmed Demographics Correct on Facesheet (P)      Source of Information family (P)      If unable to respond/provide information was family/caregiver contacted? Yes (P)      People in Home child(sergio), adult (P)      Name(s) of People in Home Modesta Bauer (P)      Do you expect to return to your current living situation? Yes (P)      Do  you have help at home or someone to help you manage your care at home? Yes (P)      Who are your caregiver(s) and their phone number(s)? Sonia White (P)      Prior to hospitilization cognitive status: Alert/Oriented (P)      Walking or Climbing Stairs Difficulty yes (P)      Walking or Climbing Stairs ambulation difficulty, requires equipment (P)      Mobility Management walker or rollator (P)      Dressing/Bathing Difficulty yes (P)      Dressing/Bathing bathing difficulty, assistance 1 person (P)      Home Accessibility wheelchair accessible (P)      Home Layout Able to live on 1st floor (P)      Equipment Currently Used at Home walker, standard;rollator;shower chair (P)      Readmission within 30 days? No (P)      Patient currently being followed by outpatient case management? No (P)      Do you currently have service(s) that help you manage your care at home? No (P)      Do you take prescription medications? Yes (P)      Do you have prescription coverage? Yes (P)      Do you have any problems affording any of your prescribed medications? No (P)      Is the patient taking medications as prescribed? yes (P)      Who is going to help you get home at discharge? Yolanda White (P)      How do you get to doctors appointments? family or friend will provide (P)      Are you on dialysis? No (P)      Do you take coumadin? No (P)      Discharge Plan A Home Health (P)      Discharge Plan B Home with family (P)      DME Needed Upon Discharge  none (P)      Discharge Plan discussed with: Adult children (P)

## 2025-07-27 NOTE — HPI
"As per  note:    84 year old female with PMHx of HTN, HLD, DMII, GERD and prior cholecystectomy with 2 different ERCP's after (most recently in January 2023 where she had multiple stones in CBD) who presented to [place] ED with abdominal pain and rigors. Labs showed elevated lipase 233, tbili 2.3,  and . WBC 1.9, Hb/Hct 13.3/39.9. Elevated LA 3.9 , got IV fluids and improved to 2.6. Gotten 2.5L of IVF. CT shows pancreas unremarkable, "mild prominence of the intra and extrahepatic ducts, likely due to post cholecystectomy changes." U/A negative for UTI. Blood cultures in process. Given IV CTX initially and then broadened to IV zosyn BP: 99/50 HR 87 on 2L NC. Transfer requested for further evaluated by GI.   Records not seen in EPIC.    At the time of my exam, she did not have any active complaints. Pleasantly confused with her baseline dementia. Son and daughter in law at bedside, they mentioned she did have gallstones in the past and underwent ERCP and does have history of pancreatitis.  Only complaint she had at home was nausea.   Admitting to ICU given no bed availability on floor. Repeat labs pending at the time of admission  GI consulted and ordered MRCP      "

## 2025-07-27 NOTE — H&P
"  [facility]  History & Physical    Patient Name: Maryam White  MRN: [id number]  Patient Class: IP- Inpatient  Admission Date: 7/27/2025  Attending Physician: Karin Duffy, *   Primary Care Provider: Brianna, Primary Doctor         Patient information was obtained from patient, relative(s), past medical records, and primary team.     Subjective:     Principal Problem:Acute pancreatitis    Chief Complaint: No chief complaint on file.       HPI: As per  note:    84 year old female with PMHx of HTN, HLD, DMII, GERD and prior cholecystectomy with 2 different ERCP's after (most recently in January 2023 where she had multiple stones in CBD) who presented to [place] ED with abdominal pain and rigors. Labs showed elevated lipase 233, tbili 2.3,  and . WBC 1.9, Hb/Hct 13.3/39.9. Elevated LA 3.9 , got IV fluids and improved to 2.6. Gotten 2.5L of IVF. CT shows pancreas unremarkable, "mild prominence of the intra and extrahepatic ducts, likely due to post cholecystectomy changes." U/A negative for UTI. Blood cultures in process. Given IV CTX initially and then broadened to IV zosyn BP: 99/50 HR 87 on 2L NC. Transfer requested for further evaluated by GI.   Records not seen in EPIC.    At the time of my exam, she did not have any active complaints. Pleasantly confused with her baseline dementia. Son and daughter in law at bedside, they mentioned she did have gallstones in the past and underwent ERCP and does have history of pancreatitis.  Only complaint she had at home was nausea.   Admitting to ICU given no bed availability on floor. Repeat labs pending at the time of admission  GI consulted and ordered MRCP        Past Medical History:   Diagnosis Date    Bell's palsy     Cataract     Depression     Diabetes mellitus, type 2     30+    Hypertension     Pancreatitis     x 3    Psoriasis     Skin cancer     Uses walker        Past Surgical History:   Procedure Laterality " Date    APPENDECTOMY       SECTION       SECTION      COLONOSCOPY      ERCP      ERCP N/A 2023    Procedure: ERCP (ENDOSCOPIC RETROGRADE CHOLANGIOPANCREATOGRAPHY);  Surgeon: Rambo Rutledge III, MD;  Location: [facility];  Service: Endoscopy;  Laterality: N/A;    GALLBLADDER SURGERY      SKIN CANCER EXCISION      TONSILLECTOMY         Review of patient's allergies indicates:   Allergen Reactions    Ibuprofen      GEMA    Metformin hcl      Other reaction(s): GEMA    Naproxen sodium      Other reaction(s): GEMA       No current facility-administered medications on file prior to encounter.     Current Outpatient Medications on File Prior to Encounter   Medication Sig    albuterol (VENTOLIN HFA) 90 mcg/actuation inhaler Inhale 1-2 puffs into the lungs every 6 (six) hours as needed for Shortness of Breath or Wheezing. Rescue    aspirin (ECOTRIN) 81 MG EC tablet Take 81 mg by mouth once daily.    atorvastatin (LIPITOR) 40 MG tablet Take 1 tablet (40 mg total) by mouth once daily.    azelastine (ASTELIN) 137 mcg (0.1 %) nasal spray 2 sprays (274 mcg total) by Nasal route 2 (two) times daily.    DULoxetine (CYMBALTA) 60 MG capsule Take 1 capsule (60 mg total) by mouth once daily.    glipiZIDE (GLUCOTROL) 5 MG tablet Take 1 tablet (5 mg total) by mouth 3 (three) times daily. (Patient taking differently: Take 5 mg by mouth 2 (two) times daily with meals.)    lancets (BD ULTRA FINE LANCETS) 33 gauge Misc 1 lancet by Misc.(Non-Drug; Combo Route) route 2 (two) times daily as needed.    LANTUS SOLOSTAR U-100 INSULIN 100 unit/mL (3 mL) InPn pen Inject 37 Units into the skin once daily.    losartan (COZAAR) 100 MG tablet TK 1 T PO  D FOR BLOOD PRESSURE    pyrilamine-chlophedianoL (NINJACOF) 12.5-12.5 mg/5 mL Liqd Take 5-10 mLs by mouth every 6 to 8 hours as needed (Cough).    traZODone (DESYREL) 50 MG tablet TK ONE T PO QHS PRN (Patient taking differently: Take 50 mg by mouth every evening. TK ONE T PO QHS  PRN)    triamcinolone acetonide 0.025% (KENALOG) 0.025 % cream Apply topically 2 (two) times daily. Breast area - folds of the skin     Family History    None       Tobacco Use    Smoking status: Never    Smokeless tobacco: Never   Substance and Sexual Activity    Alcohol use: Never    Drug use: Never    Sexual activity: Not on file     Review of Systems   Constitutional:  Negative for chills and fever.   Respiratory:  Negative for cough, chest tightness and shortness of breath.    Cardiovascular:  Negative for chest pain, palpitations and leg swelling.   Gastrointestinal:  Negative for abdominal pain, nausea and vomiting.   Genitourinary:  Negative for dysuria, frequency and urgency.   Skin:  Positive for rash.   Neurological:  Negative for dizziness, syncope and headaches.   Psychiatric/Behavioral:  Negative for agitation.      Objective:     Vital Signs (Most Recent):  Temp: 98.3 °F (36.8 °C) (07/27/25 0822)  Pulse: 83 (07/27/25 0927)  Resp: 16 (07/27/25 0927)  BP: 117/61 (07/27/25 0900)  SpO2: (!) 91 % (07/27/25 0927) Vital Signs (24h Range):  Temp:  [98.3 °F (36.8 °C)] 98.3 °F (36.8 °C)  Pulse:  [83-92] 83  Resp:  [15-22] 16  SpO2:  [89 %-98 %] 91 %  BP: ()/(55-64) 117/61        There is no height or weight on file to calculate BMI.     Physical Exam  Constitutional:       General: She is not in acute distress.     Appearance: Normal appearance. She is not toxic-appearing.   HENT:      Head: Normocephalic.      Mouth/Throat:      Mouth: Mucous membranes are dry.   Cardiovascular:      Rate and Rhythm: Normal rate and regular rhythm.      Pulses: Normal pulses.   Pulmonary:      Effort: Pulmonary effort is normal.      Breath sounds: Normal breath sounds.   Abdominal:      General: Abdomen is flat. There is no distension.      Palpations: Abdomen is soft.      Tenderness: There is no abdominal tenderness.   Musculoskeletal:         General: No swelling.   Skin:     Findings: Rash present.      Comments:  "Psoriasis, candida under skin folds near breast and groin   Neurological:      General: No focal deficit present.      Mental Status: She is alert. Mental status is at baseline.      Comments: Dementia at baseline   Psychiatric:         Mood and Affect: Mood normal.                Significant Labs: All pertinent labs within the past 24 hours have been reviewed.  CBC:   Recent Labs   Lab 07/27/25  0956   WBC 22.22*   HGB 12.2   HCT 37.2        CMP:   Recent Labs   Lab 07/27/25  0956   *   K 4.2      CO2 27   *   BUN 16   CREATININE 1.2   CALCIUM 8.6*   PROT 5.7*   ALBUMIN 3.2*   BILITOT 1.5*   ALKPHOS 379*   *   *   ANIONGAP 4*     Lactic Acid:   Recent Labs   Lab 07/27/25  0956   LACTATE 2.2*     Lipase: No results for input(s): "LIPASE" in the last 48 hours.    Significant Imaging: I have reviewed all pertinent imaging results/findings within the past 24 hours.  Assessment/Plan:     Assessment & Plan  Pancreatitis  - IVF  - NPO  - CT reviewed-pancreas unremarkable  - CMP daily  - GI consulted  - Pain meds PRN  - Nausea meds PRN  - Trend Lipase  - Clinically no abdominal pain or tenderness, does not fit in criteria    Elevated LFTs  - MRCP ordered  -If positive she requires ERCP  - cholecystectomy in the past    Depressive disorder  Aware  Home meds      Essential hypertension  Patient's blood pressure range in the last 24 hours was: BP  Min: 98/62  Max: 117/61.The patient's inpatient anti-hypertensive regimen is listed below:  Current Antihypertensives       Plan  - Hold  Hyperlipidemia  Hold statin with elevated LFT    Type 2 diabetes mellitus with other specified complication  - Insulin glargine and sliding scale adjust based on BG and diet tolerance  - POCT glucose  - A1C    Primary insomnia  Home Trazodone    Lactic acidosis  IVF  Repeat lactate  Follow cultures  IV Ceftriaxone and Flagyl empirically    Psoriasis  - With yeast infection ( refer to pics)  - Topical " triamcinolone at home  - Topical miconazole  - If not better, start oral treatment, Fluconazole      VTE Risk Mitigation (From admission, onward)           Ordered     IP VTE HIGH RISK PATIENT  Once         07/27/25 0851     Place sequential compression device  Until discontinued         07/27/25 0851                              Critical care time spent on the evaluation and treatment of severe organ dysfunction, review of pertinent labs and imaging studies, discussions with consulting providers and discussions with patient/family: 60 minutes.             Automatic Inhaler to Nebulizer Interchange    albuterol (Ventolin, ProAir, or Proventil)  mcg given multiple times per day changed to albuterol 2.5 mg Q6H prn wheezing, shortness of breath per [facility] Automatic Therapeutic Substitutions Protocol.    Please contact pharmacy at extension [phone] with any questions.     Thank you,   Kiarra Duffy MD  Department of Hospital Medicine  [facility]           No

## 2025-07-27 NOTE — CARE UPDATE
07/27/25 0927   Patient Assessment/Suction   Level of Consciousness (AVPU) responds to voice   Respiratory Effort Unlabored;Normal   Expansion/Accessory Muscles/Retractions no use of accessory muscles;no retractions;expansion symmetric   All Lung Fields Breath Sounds clear   Rhythm/Pattern, Respiratory unlabored;pattern regular;depth regular;chest wiggle adequate;no shortness of breath reported   Cough Frequency no cough   PRE-TX-O2   Device (Oxygen Therapy) nasal cannula   $ Is the patient on Low Flow Oxygen? Yes   Flow (L/min) (Oxygen Therapy) 2   SpO2 (!) 91 %   Pulse Oximetry Type Continuous   $ Pulse Oximetry - Multiple Charge Pulse Oximetry - Multiple   Pulse 83   Resp 16   Aerosol Therapy   $ Aerosol Therapy Charges PRN treatment not required   Education   $ Education 15 min;Oxygen   Tobacco Cessation Intervention   Do you use any type of tobacco product? No   Respiratory Evaluation   $ Care Plan Tech Time 15 min   $ Respiratory Evaluation Complete   Evaluation For New Orders   Admitting Diagnosis pancreatitis   Cardiac Diagnosis he   Pulmonary Diagnosis ypertension   Current Surgeries na   Home Oxygen   Has Home Oxygen? No   Home Aerosol, MDI, DPI, and Other Treatments/Therapies   Home Respiratory Therapy Per Patient/Review of Chart Yes   MDI Home Meds/Freq albuterol inhaler   Oxygen Care Plan   Oxygen Care Plan Per Protocol   SPO2 Goal (%) MD order   Bronchodilator Care Plan   Bronchodilator Care Plan Aerosol   Aerosol Meds w/ frequency Ventolin/Proventil(Albuterol Sulfate) 2.5mg PRN   Atelectasis Care Plan   Rationale No Rational Found   Airway Clearance Care Plan   Rationale No rationale found

## 2025-07-27 NOTE — PROVIDER TRANSFER
"   Outside Transfer Acceptance Note / [facility]    Referring facility:    Referring provider: ZHANNA COBURN  Accepting facility: [facility]  Accepting provider: Dr. Claire Mendoza  Admitting provider: Dr. Claire Mendoza  Reason for transfer: Established Provider   Transfer diagnosis: Pancreatitis  Transfer specialty requested: Pancreatic [place]illi[place]  Transfer specialty notified: No  Transfer level: NUMBER 1-5: 2  Bed type requested: stepdown  Isolation status: No active isolations   Admission class or status: IP- Inpatient      Narrative     84 year old female with PMHx of HTN, HLD, DMII, GERD and prior cholecystectomy with 2 different ERCP's after (most recently in January 2023 where she had multiple stones in CBD) who presented to the ED with abdominal pain and rigors. Labs notable for lipase 233, tbili 2.3,  and . WBC 1.9, Hb/Hct 13.3/39.9, LA 3.9 now 2.6. Gotten 2.5L of IVF. CT shows pancreas unremarkable, "mild prominence of the intra and extrahepatic ducts, likely due to post cholecystectomy changes." U/A negative for UTI. Blood cultures in process. Given IV CTX initially and then broadened to IV zosyn BP: 99/50 HR 87 on 2L NC. Transfer requested for further evaluated by GI.     Objective     Vitals:    Recent Labs: All pertinent labs within the past 24 hours have been reviewed.  Recent imaging: CT A/P   Airway:     Vent settings:         IV access:    Infusions: IVF  Allergies:   Review of patient's allergies indicates:   Allergen Reactions    Ibuprofen      GEMA    Metformin hcl      Other reaction(s): GEMA    Naproxen sodium      Other reaction(s): GEMA      NPO: No    Anticoagulation:   Anticoagulants       None             Instructions      [facility]  Admit to Hospital Medicine  Upon patient arrival to floor, please contact Hospital Medicine on call.     "

## 2025-07-27 NOTE — SUBJECTIVE & OBJECTIVE
Past Medical History:   Diagnosis Date    Bell's palsy     Cataract     Depression     Diabetes mellitus, type 2     30+    Hypertension     Pancreatitis     x 3    Psoriasis     Skin cancer     Uses walker        Past Surgical History:   Procedure Laterality Date    APPENDECTOMY       SECTION       SECTION      COLONOSCOPY      ERCP      ERCP N/A 2023    Procedure: ERCP (ENDOSCOPIC RETROGRADE CHOLANGIOPANCREATOGRAPHY);  Surgeon: Rambo Rutledge III, MD;  Location: [facility];  Service: Endoscopy;  Laterality: N/A;    GALLBLADDER SURGERY      SKIN CANCER EXCISION      TONSILLECTOMY         Review of patient's allergies indicates:   Allergen Reactions    Ibuprofen      GEMA    Metformin hcl      Other reaction(s): GEMA    Naproxen sodium      Other reaction(s): GEMA       No current facility-administered medications on file prior to encounter.     Current Outpatient Medications on File Prior to Encounter   Medication Sig    albuterol (VENTOLIN HFA) 90 mcg/actuation inhaler Inhale 1-2 puffs into the lungs every 6 (six) hours as needed for Shortness of Breath or Wheezing. Rescue    aspirin (ECOTRIN) 81 MG EC tablet Take 81 mg by mouth once daily.    atorvastatin (LIPITOR) 40 MG tablet Take 1 tablet (40 mg total) by mouth once daily.    azelastine (ASTELIN) 137 mcg (0.1 %) nasal spray 2 sprays (274 mcg total) by Nasal route 2 (two) times daily.    DULoxetine (CYMBALTA) 60 MG capsule Take 1 capsule (60 mg total) by mouth once daily.    glipiZIDE (GLUCOTROL) 5 MG tablet Take 1 tablet (5 mg total) by mouth 3 (three) times daily. (Patient taking differently: Take 5 mg by mouth 2 (two) times daily with meals.)    lancets (BD ULTRA FINE LANCETS) 33 gauge Misc 1 lancet by Misc.(Non-Drug; Combo Route) route 2 (two) times daily as needed.    LANTUS SOLOSTAR U-100 INSULIN 100 unit/mL (3 mL) InPn pen Inject 37 Units into the skin once daily.    losartan (COZAAR) 100 MG tablet TK 1 T PO  D FOR BLOOD  PRESSURE    pyrilamine-chlophedianoL (NINJACOF) 12.5-12.5 mg/5 mL Liqd Take 5-10 mLs by mouth every 6 to 8 hours as needed (Cough).    traZODone (DESYREL) 50 MG tablet TK ONE T PO QHS PRN (Patient taking differently: Take 50 mg by mouth every evening. TK ONE T PO QHS PRN)    triamcinolone acetonide 0.025% (KENALOG) 0.025 % cream Apply topically 2 (two) times daily. Breast area - folds of the skin     Family History    None       Tobacco Use    Smoking status: Never    Smokeless tobacco: Never   Substance and Sexual Activity    Alcohol use: Never    Drug use: Never    Sexual activity: Not on file     Review of Systems   Constitutional:  Negative for chills and fever.   Respiratory:  Negative for cough, chest tightness and shortness of breath.    Cardiovascular:  Negative for chest pain, palpitations and leg swelling.   Gastrointestinal:  Negative for abdominal pain, nausea and vomiting.   Genitourinary:  Negative for dysuria, frequency and urgency.   Skin:  Positive for rash.   Neurological:  Negative for dizziness, syncope and headaches.   Psychiatric/Behavioral:  Negative for agitation.      Objective:     Vital Signs (Most Recent):  Temp: 98.3 °F (36.8 °C) (07/27/25 0822)  Pulse: 83 (07/27/25 0927)  Resp: 16 (07/27/25 0927)  BP: 117/61 (07/27/25 0900)  SpO2: (!) 91 % (07/27/25 0927) Vital Signs (24h Range):  Temp:  [98.3 °F (36.8 °C)] 98.3 °F (36.8 °C)  Pulse:  [83-92] 83  Resp:  [15-22] 16  SpO2:  [89 %-98 %] 91 %  BP: ()/(55-64) 117/61        There is no height or weight on file to calculate BMI.     Physical Exam  Constitutional:       General: She is not in acute distress.     Appearance: Normal appearance. She is not toxic-appearing.   HENT:      Head: Normocephalic.      Mouth/Throat:      Mouth: Mucous membranes are dry.   Cardiovascular:      Rate and Rhythm: Normal rate and regular rhythm.      Pulses: Normal pulses.   Pulmonary:      Effort: Pulmonary effort is normal.      Breath sounds: Normal  "breath sounds.   Abdominal:      General: Abdomen is flat. There is no distension.      Palpations: Abdomen is soft.      Tenderness: There is no abdominal tenderness.   Musculoskeletal:         General: No swelling.   Skin:     Findings: Rash present.      Comments: Psoriasis, candida under skin folds near breast and groin   Neurological:      General: No focal deficit present.      Mental Status: She is alert. Mental status is at baseline.      Comments: Dementia at baseline   Psychiatric:         Mood and Affect: Mood normal.                Significant Labs: All pertinent labs within the past 24 hours have been reviewed.  CBC:   Recent Labs   Lab 07/27/25  0956   WBC 22.22*   HGB 12.2   HCT 37.2        CMP:   Recent Labs   Lab 07/27/25  0956   *   K 4.2      CO2 27   *   BUN 16   CREATININE 1.2   CALCIUM 8.6*   PROT 5.7*   ALBUMIN 3.2*   BILITOT 1.5*   ALKPHOS 379*   *   *   ANIONGAP 4*     Lactic Acid:   Recent Labs   Lab 07/27/25  0956   LACTATE 2.2*     Lipase: No results for input(s): "LIPASE" in the last 48 hours.    Significant Imaging: I have reviewed all pertinent imaging results/findings within the past 24 hours.  "

## 2025-07-27 NOTE — ASSESSMENT & PLAN NOTE
- IVF  - NPO  - CT reviewed-pancreas unremarkable  - CMP daily  - GI consulted  - Pain meds PRN  - Nausea meds PRN  - Trend Lipase  - Clinically no abdominal pain or tenderness, does not fit in criteria

## 2025-07-28 PROBLEM — K86.1 OTHER CHRONIC PANCREATITIS: Status: ACTIVE | Noted: 2025-07-27

## 2025-07-28 PROBLEM — R79.89 ELEVATED LFTS: Status: ACTIVE | Noted: 2025-07-28

## 2025-07-28 LAB
ABSOLUTE EOSINOPHIL (SMH): 0.14 K/UL
ABSOLUTE MONOCYTE (SMH): 1.02 K/UL (ref 0.3–1)
ABSOLUTE NEUTROPHIL COUNT (SMH): 10.9 K/UL (ref 1.8–7.7)
ALBUMIN SERPL-MCNC: 3.2 G/DL (ref 3.5–5.2)
ALP SERPL-CCNC: 359 UNIT/L (ref 55–135)
ALT SERPL-CCNC: 289 UNIT/L (ref 10–44)
ANION GAP (SMH): 6 MMOL/L (ref 8–16)
AST SERPL-CCNC: 158 UNIT/L (ref 10–40)
BASOPHILS # BLD AUTO: 0.06 K/UL
BASOPHILS NFR BLD AUTO: 0.5 %
BILIRUB SERPL-MCNC: 1.6 MG/DL (ref 0.1–1)
BUN SERPL-MCNC: 15 MG/DL (ref 8–23)
CALCIUM SERPL-MCNC: 8.7 MG/DL (ref 8.7–10.5)
CHLORIDE SERPL-SCNC: 105 MMOL/L (ref 95–110)
CO2 SERPL-SCNC: 26 MMOL/L (ref 23–29)
CREAT SERPL-MCNC: 1 MG/DL (ref 0.5–1.4)
ERYTHROCYTE [DISTWIDTH] IN BLOOD BY AUTOMATED COUNT: 13 % (ref 11.5–14.5)
GFR SERPLBLD CREATININE-BSD FMLA CKD-EPI: 55 ML/MIN/1.73/M2
GLUCOSE SERPL-MCNC: 163 MG/DL (ref 70–110)
HCT VFR BLD AUTO: 37 % (ref 37–48.5)
HGB BLD-MCNC: 11.6 GM/DL (ref 12–16)
IMM GRANULOCYTES # BLD AUTO: 0.11 K/UL (ref 0–0.04)
IMM GRANULOCYTES NFR BLD AUTO: 0.9 % (ref 0–0.5)
LIPASE SERPL-CCNC: 394 U/L (ref 4–60)
LYMPHOCYTES # BLD AUTO: 0.5 K/UL (ref 1–4.8)
MAGNESIUM SERPL-MCNC: 1.8 MG/DL (ref 1.6–2.6)
MCH RBC QN AUTO: 30.1 PG (ref 27–31)
MCHC RBC AUTO-ENTMCNC: 31.4 G/DL (ref 32–36)
MCV RBC AUTO: 96 FL (ref 82–98)
NUCLEATED RBC (/100WBC) (SMH): 0 /100 WBC
PLATELET # BLD AUTO: 160 K/UL (ref 150–450)
PMV BLD AUTO: 9.7 FL (ref 9.2–12.9)
POCT GLUCOSE: 160 MG/DL (ref 70–110)
POCT GLUCOSE: 166 MG/DL (ref 70–110)
POTASSIUM SERPL-SCNC: 4.2 MMOL/L (ref 3.5–5.1)
PROT SERPL-MCNC: 5.9 GM/DL (ref 6–8.4)
RBC # BLD AUTO: 3.85 M/UL (ref 4–5.4)
RELATIVE EOSINOPHIL (SMH): 1.1 % (ref 0–8)
RELATIVE LYMPHOCYTE (SMH): 3.9 % (ref 18–48)
RELATIVE MONOCYTE (SMH): 8 % (ref 4–15)
RELATIVE NEUTROPHIL (SMH): 85.6 % (ref 38–73)
SODIUM SERPL-SCNC: 137 MMOL/L (ref 136–145)
WBC # BLD AUTO: 12.7 K/UL (ref 3.9–12.7)

## 2025-07-28 PROCEDURE — 83735 ASSAY OF MAGNESIUM: CPT

## 2025-07-28 PROCEDURE — 99900031 HC PATIENT EDUCATION (STAT)

## 2025-07-28 PROCEDURE — 99900035 HC TECH TIME PER 15 MIN (STAT)

## 2025-07-28 PROCEDURE — 36415 COLL VENOUS BLD VENIPUNCTURE: CPT

## 2025-07-28 PROCEDURE — 11000001 HC ACUTE MED/SURG PRIVATE ROOM

## 2025-07-28 PROCEDURE — 83690 ASSAY OF LIPASE: CPT

## 2025-07-28 PROCEDURE — 63700000 PHARM REV CODE 250 ALT 637 W/O HCPCS

## 2025-07-28 PROCEDURE — 63600175 PHARM REV CODE 636 W HCPCS

## 2025-07-28 PROCEDURE — 25000003 PHARM REV CODE 250

## 2025-07-28 PROCEDURE — 94761 N-INVAS EAR/PLS OXIMETRY MLT: CPT

## 2025-07-28 PROCEDURE — 85025 COMPLETE CBC W/AUTO DIFF WBC: CPT

## 2025-07-28 PROCEDURE — 27000221 HC OXYGEN, UP TO 24 HOURS

## 2025-07-28 PROCEDURE — 80053 COMPREHEN METABOLIC PANEL: CPT

## 2025-07-28 RX ORDER — DOXYLAMINE SUCCINATE 25 MG
TABLET ORAL 2 TIMES DAILY
Status: DISCONTINUED | OUTPATIENT
Start: 2025-07-28 | End: 2025-07-31 | Stop reason: HOSPADM

## 2025-07-28 RX ORDER — FLUCONAZOLE 100 MG/1
100 TABLET ORAL DAILY
Status: DISCONTINUED | OUTPATIENT
Start: 2025-07-28 | End: 2025-07-29

## 2025-07-28 RX ADMIN — ACETAMINOPHEN, DEXTROMETHORPHAN HYDROBROMIDE, GUAIFENESIN, AND PHENYLEPHRINE HYDROCHLORIDE: 325; 10; 200; 5 TABLET, COATED ORAL at 09:07

## 2025-07-28 RX ADMIN — Medication 6 MG: at 09:07

## 2025-07-28 RX ADMIN — DULOXETINE 60 MG: 30 CAPSULE, DELAYED RELEASE ORAL at 09:07

## 2025-07-28 RX ADMIN — MUPIROCIN 1 G: 20 OINTMENT TOPICAL at 09:07

## 2025-07-28 RX ADMIN — LOSARTAN POTASSIUM 12.5 MG: 25 TABLET, FILM COATED ORAL at 04:07

## 2025-07-28 RX ADMIN — METRONIDAZOLE 500 MG: 5 INJECTION, SOLUTION INTRAVENOUS at 09:07

## 2025-07-28 RX ADMIN — PANTOPRAZOLE SODIUM 40 MG: 40 TABLET, DELAYED RELEASE ORAL at 05:07

## 2025-07-28 RX ADMIN — FLUCONAZOLE 100 MG: 100 TABLET ORAL at 12:07

## 2025-07-28 RX ADMIN — SODIUM CHLORIDE, SODIUM LACTATE, POTASSIUM CHLORIDE, AND CALCIUM CHLORIDE: 600; 310; 30; 20 INJECTION, SOLUTION INTRAVENOUS at 09:07

## 2025-07-28 RX ADMIN — INSULIN GLARGINE 10 UNITS: 100 INJECTION, SOLUTION SUBCUTANEOUS at 09:07

## 2025-07-28 RX ADMIN — CEFTRIAXONE 2 G: 2 INJECTION, POWDER, FOR SOLUTION INTRAMUSCULAR; INTRAVENOUS at 12:07

## 2025-07-28 RX ADMIN — TRIAMCINOLONE ACETONIDE: 1 CREAM TOPICAL at 09:07

## 2025-07-28 RX ADMIN — METRONIDAZOLE 500 MG: 5 INJECTION, SOLUTION INTRAVENOUS at 04:07

## 2025-07-28 RX ADMIN — WHITE PETROLATUM 41 % TOPICAL OINTMENT: at 09:07

## 2025-07-28 RX ADMIN — METRONIDAZOLE 500 MG: 5 INJECTION, SOLUTION INTRAVENOUS at 12:07

## 2025-07-28 NOTE — PLAN OF CARE
07/28/25 0756   Patient Assessment/Suction   Level of Consciousness (AVPU) alert   Respiratory Effort Unlabored   Expansion/Accessory Muscles/Retractions no use of accessory muscles;no retractions   All Lung Fields Breath Sounds clear   Rhythm/Pattern, Respiratory unlabored;pattern regular;depth regular   PRE-TX-O2   Device (Oxygen Therapy) nasal cannula   $ Is the patient on Low Flow Oxygen? Yes   Flow (L/min) (Oxygen Therapy) 2   SpO2 97 %   Pulse Oximetry Type Continuous   $ Pulse Oximetry - Multiple Charge Pulse Oximetry - Multiple   Pulse 91   Resp (!) 0   BP (!) 145/60   Aerosol Therapy   $ Aerosol Therapy Charges PRN treatment not required   Education   $ Education 15 min;Oxygen

## 2025-07-28 NOTE — HOSPITAL COURSE
84-year-old female with history of prior cholecystectomy, prior ERCP with multiple stones and CBD, presented with abdominal pain.  She was closely monitored after hospitalization.  LFTs were elevated.  CT abdomen at outside hospital showed mild prominence of intra and extrahepatic ducts, pancreas unremarkable.  Lactic acid as well as elevated and improved with IV fluids.  She was started on IV ceftriaxone and IV Flagyl.  MRCP showed mildly prominent CBD with periampullary defects suggestive of stones.  Gastroenterology for which patient underwent ERCP on July 29, 2025 and stones were successfully removed..  Meanwhile she was kept on clear liquid diet and tolerated well.  Patient's caretaker and son Kofi requested a phone call with update while he is not here/as patient has dementia.  Patient noted to have extensive perineal and abdominal folds candidiasis which required topical and oral antifungal treatments.  Patient to be discharged home with 7 day course of fluconazole with miconazole cream twice daily.

## 2025-07-28 NOTE — SUBJECTIVE & OBJECTIVE
Interval History:  Patient is seen and examined.  Blood pressure improved.  Leukocytosis resolved.  Chest x-ray reviewed.  Stable on 2 L (home oxygen).  Lactate normal.  Discussed with Gastroenterology, ERCP tomorrow.  NPO past midnight and start clear liquid diet now.  Discussed with granddaughter at bedside and with son on phone about the care plan.  Patient is pleasantly confused from her dementia.  PT/OT consulted.  Still has significant yeast infection over psoriasis, on triamcinolone and miconazole topical, add oral fluconazole.  We will watch liver function closely.  All the AST/ALT trending down, T bili 1.5 --> 1.6    Review of Systems  Objective:     Vital Signs (Most Recent):  Temp: 98.8 °F (37.1 °C) (07/28/25 1200)  Pulse: 88 (07/28/25 1301)  Resp: 15 (07/28/25 1301)  BP: (!) 178/69 (07/28/25 1301)  SpO2: 95 % (07/28/25 1301) Vital Signs (24h Range):  Temp:  [98.3 °F (36.8 °C)-98.8 °F (37.1 °C)] 98.8 °F (37.1 °C)  Pulse:  [72-96] 88  Resp:  [0-27] 15  SpO2:  [91 %-100 %] 95 %  BP: ()/(50-79) 178/69     Weight: 88.7 kg (195 lb 8.8 oz)  Body mass index is 35.77 kg/m².    Intake/Output Summary (Last 24 hours) at 7/28/2025 1444  Last data filed at 7/28/2025 1006  Gross per 24 hour   Intake 2145.9 ml   Output 1140 ml   Net 1005.9 ml         Physical Exam  Constitutional:       General: She is not in acute distress.     Appearance: Normal appearance. She is not toxic-appearing.   HENT:      Head: Normocephalic.      Mouth/Throat:      Mouth: Mucous membranes are moist.   Cardiovascular:      Rate and Rhythm: Normal rate and regular rhythm.      Pulses: Normal pulses.   Pulmonary:      Effort: Pulmonary effort is normal.      Breath sounds: Normal breath sounds.   Abdominal:      General: Abdomen is flat. There is no distension.      Palpations: Abdomen is soft.      Tenderness: There is no abdominal tenderness.   Musculoskeletal:         General: No swelling.   Skin:     Findings: Rash present.       Comments: Psoriasis, candida under skin folds near breast and groin   Neurological:      General: No focal deficit present.      Mental Status: She is alert. Mental status is at baseline.      Comments: Dementia at baseline   Psychiatric:         Mood and Affect: Mood normal.               Significant Labs: All pertinent labs within the past 24 hours have been reviewed.    Significant Imaging: I have reviewed all pertinent imaging results/findings within the past 24 hours.

## 2025-07-28 NOTE — ASSESSMENT & PLAN NOTE
- With yeast infection ( refer to pics)  - Topical triamcinolone at home  - Topical miconazole  - If not better, start oral treatment, Fluconazole

## 2025-07-28 NOTE — PLAN OF CARE
Present during SIBR with pt who had granddaughter present. Granddaughter had pt son Kofi on speaker phone. He stresses that he would like to keep [facility] at discharge. Plan for ERCP in am.   Verified PCP with pt and added provider to chart       07/28/25 1022   Rounds   Attendance Provider;Nurse    Discharge Plan A Home Health   Why the patient remains in the hospital Requires continued medical care   Transition of Care Barriers None

## 2025-07-28 NOTE — ASSESSMENT & PLAN NOTE
Patient's blood pressure range in the last 24 hours was: BP  Min: 89/51  Max: 183/79.The patient's inpatient anti-hypertensive regimen is listed below:  Current Antihypertensives  losartan split tablet 12.5 mg, Daily, Oral    Plan  - restart home meds at a lower dose and watch closely/adjust

## 2025-07-28 NOTE — ASSESSMENT & PLAN NOTE
- With yeast infection ( refer to pics)  - Topical triamcinolone at home  - Topical miconazole  - oral Fluconazole 100 mg

## 2025-07-28 NOTE — PROGRESS NOTES
"[facility]  Progress Note    Patient Name: Maryam White  MRN: [id number]  Patient Class: IP- Inpatient   Admission Date: 7/27/2025  Length of Stay: 1 days  Attending Physician: Karin Duffy, *  Primary Care Provider: Adan Jarrell NP        Subjective     Principal Problem:Pancreatitis        HPI:  As per  note:    84 year old female with PMHx of HTN, HLD, DMII, GERD and prior cholecystectomy with 2 different ERCP's after (most recently in January 2023 where she had multiple stones in CBD) who presented to [place] ED with abdominal pain and rigors. Labs showed elevated lipase 233, tbili 2.3,  and . WBC 1.9, Hb/Hct 13.3/39.9. Elevated LA 3.9 , got IV fluids and improved to 2.6. Gotten 2.5L of IVF. CT shows pancreas unremarkable, "mild prominence of the intra and extrahepatic ducts, likely due to post cholecystectomy changes." U/A negative for UTI. Blood cultures in process. Given IV CTX initially and then broadened to IV zosyn BP: 99/50 HR 87 on 2L NC. Transfer requested for further evaluated by GI.   Records not seen in EPIC.    At the time of my exam, she did not have any active complaints. Pleasantly confused with her baseline dementia. Son and daughter in law at bedside, they mentioned she did have gallstones in the past and underwent ERCP and does have history of pancreatitis.  Only complaint she had at home was nausea.   Admitting to ICU given no bed availability on floor. Repeat labs pending at the time of admission  GI consulted and ordered MRCP        Overview/Hospital Course:  84-year-old female with history of prior cholecystectomy, prior ERCP with multiple stones and CBD, presented with abdominal pain.  She was closely monitored after hospitalization.  LFTs were elevated.  CT abdomen at outside hospital showed mild prominence of intra and extrahepatic ducts, pancreas unremarkable.  Lactic acid as well as elevated and improved with IV " fluids.  She was started on IV ceftriaxone and IV Flagyl.  MRCP showed mildly prominent CBD with periampullary defects suggestive of stones.  Gastroenterology planned ERCP 7/29.  Meanwhile she was kept on clear liquid diet and tolerated well.  Patient's caretaker and son Kofi requested a phone call with update while he is not here/as patient has dementia.    Interval History:  Patient is seen and examined.  Blood pressure improved.  Leukocytosis resolved.  Chest x-ray reviewed.  Stable on 2 L (home oxygen).  Lactate normal.  Discussed with Gastroenterology, ERCP tomorrow.  NPO past midnight and start clear liquid diet now.  Discussed with granddaughter at bedside and with son on phone about the care plan.  Patient is pleasantly confused from her dementia.  PT/OT consulted.  Still has significant yeast infection over psoriasis, on triamcinolone and miconazole topical, add oral fluconazole.  We will watch liver function closely.  All the AST/ALT trending down, T bili 1.5 --> 1.6    Review of Systems  Objective:     Vital Signs (Most Recent):  Temp: 98.8 °F (37.1 °C) (07/28/25 1200)  Pulse: 88 (07/28/25 1301)  Resp: 15 (07/28/25 1301)  BP: (!) 178/69 (07/28/25 1301)  SpO2: 95 % (07/28/25 1301) Vital Signs (24h Range):  Temp:  [98.3 °F (36.8 °C)-98.8 °F (37.1 °C)] 98.8 °F (37.1 °C)  Pulse:  [72-96] 88  Resp:  [0-27] 15  SpO2:  [91 %-100 %] 95 %  BP: ()/(50-79) 178/69     Weight: 88.7 kg (195 lb 8.8 oz)  Body mass index is 35.77 kg/m².    Intake/Output Summary (Last 24 hours) at 7/28/2025 1444  Last data filed at 7/28/2025 1006  Gross per 24 hour   Intake 2145.9 ml   Output 1140 ml   Net 1005.9 ml         Physical Exam  Constitutional:       General: She is not in acute distress.     Appearance: Normal appearance. She is not toxic-appearing.   HENT:      Head: Normocephalic.      Mouth/Throat:      Mouth: Mucous membranes are moist.   Cardiovascular:      Rate and Rhythm: Normal rate and regular rhythm.       Pulses: Normal pulses.   Pulmonary:      Effort: Pulmonary effort is normal.      Breath sounds: Normal breath sounds.   Abdominal:      General: Abdomen is flat. There is no distension.      Palpations: Abdomen is soft.      Tenderness: There is no abdominal tenderness.   Musculoskeletal:         General: No swelling.   Skin:     Findings: Rash present.      Comments: Psoriasis, candida under skin folds near breast and groin   Neurological:      General: No focal deficit present.      Mental Status: She is alert. Mental status is at baseline.      Comments: Dementia at baseline   Psychiatric:         Mood and Affect: Mood normal.               Significant Labs: All pertinent labs within the past 24 hours have been reviewed.    Significant Imaging: I have reviewed all pertinent imaging results/findings within the past 24 hours.      Assessment & Plan  Pancreatitis  - IVF  - NPO  - CT reviewed-pancreas unremarkable  - CMP daily  - GI consulted  - Pain meds PRN  - Nausea meds PRN  - Trend Lipase  - Clinically no abdominal pain or tenderness, does not fit in criteria    Elevated LFTs  - MRCP shows possible gallstones  - cholecystectomy in the past  - ERCP scheduled 7/29    Depressive disorder  Aware  Home meds      Essential hypertension  Patient's blood pressure range in the last 24 hours was: BP  Min: 89/51  Max: 183/79.The patient's inpatient anti-hypertensive regimen is listed below:  Current Antihypertensives  losartan split tablet 12.5 mg, Daily, Oral    Plan  - restart home meds at a lower dose and watch closely/adjust  Hyperlipidemia  Hold statin with elevated LFT    Type 2 diabetes mellitus with other specified complication  - Insulin glargine and sliding scale adjust based on BG and diet tolerance  - POCT glucose  - A1C eight    Primary insomnia  Home Trazodone    Lactic acidosis  IVF  Repeat lactate normal  Follow cultures  IV Ceftriaxone and Flagyl empirically    Psoriasis  - With yeast infection ( refer to  pics)  - Topical triamcinolone at home  - Topical miconazole  - oral Fluconazole 100 mg    VTE Risk Mitigation (From admission, onward)           Ordered     IP VTE HIGH RISK PATIENT  Once         07/27/25 0851     Place sequential compression device  Until discontinued         07/27/25 0851                    Discharge Planning   SARI: 7/30/2025     Code Status: Full Code   Medical Readiness for Discharge Date:   Discharge Plan A: Home Health   Discharge Delays: None known at this time          Critical care time spent on the evaluation and treatment of severe organ dysfunction, review of pertinent labs and imaging studies, discussions with consulting providers and discussions with patient/family: 45 minutes.    Please place Justification for DME      Karin Duffy MD  Department of Hospital Medicine   [facility]

## 2025-07-28 NOTE — ASSESSMENT & PLAN NOTE
- Insulin glargine and sliding scale adjust based on BG and diet tolerance  - POCT glucose  - A1C eight

## 2025-07-28 NOTE — PLAN OF CARE
SW called pt's caregiver Kofi to get the name of the HH agency they wanted    Patient/family provided list of facilities in-network with patient's payor plan. Providers that are owned, operated, or affiliated with [facility] are included on the list.     Notified that referral sent to below listed facilities from in-network list based on proximity to home/family support:   1.  2.  3.  4.  5. (can send more than 5)    Patient/family instructed to identify preference.    Preferred Facility: (if more than 1, listed in order of descending preference)  [facility]    OR  Patient has declined to select a preferred provider and elects placement with the first accepting in network provider that is available to provide services as ordered by the physician       If an additional preferred facility not listed above is identified, additional referral to be sent. If above facilities unable to accept, will send additional referrals to in-network providers.    07/28/25 0855   Post-Acute Status   Post-Acute Authorization Home [facility]   Home Health Status Referrals Sent   Discharge Delays None known at this time   Discharge Plan   Discharge Plan A Home Health   Discharge Plan B Home Health

## 2025-07-28 NOTE — NURSING
[facility] Care Plan    POC reviewed with Maryam White overnight. Son called in and received update over phone. Family verbalized understanding. Questions and concerns addressed.     Vitals stable, on 2L NC. Pt had abdominal complaints of pain overnight, not relieved with PRN [place]. Reached out to Doug MAGANA for different pain medication. PRN Dilaudid given and relief obtained.  Rodríguez cath in place, ~800cc output overnight. Pt has short memory span with her dementia it seems. Disoriented to place, situation, and date. Oriented to self and her family.   ERCP procedure explained to her but patient did not process this information. Son aware and acknowledges this level of memory lapsing to be in the ballpark of her normal.     Plan is for ERCP tomorrow 7/29.     See below and flowsheets for full assessment and VS info.           Neuro:  Deisy Coma Scale  Best Eye Response: 4-->(E4) spontaneous  Best Motor Response: 6-->(M6) obeys commands  Best Verbal Response: 4-->(V4) confused  Deisy Coma Scale Score: 14  Assessment Qualifiers: Patient not sedated/intubated, No eye obstruction present  Pupil PERRLA: yes     24 hr Temp:  [98.3 °F (36.8 °C)-98.8 °F (37.1 °C)]     CV:   Rhythm: normal sinus rhythm      Resp:    2L NC    GI/:     Diet/Nutrition Received: regular  Last Bowel Movement: 07/26/25  Voiding Characteristics: urethral catheter (bladder)    Intake/Output Summary (Last 24 hours) at 7/28/2025 0624  Last data filed at 7/28/2025 0530  Gross per 24 hour   Intake 2045.9 ml   Output 1480 ml   Net 565.9 ml     Unmeasured Output  Unmeasured Stool Occurrence: 0    Labs/Accuchecks:  Recent Labs   Lab 07/28/25  0301   WBC 12.70   RBC 3.85*   HGB 11.6*   HCT 37.0         Recent Labs   Lab 07/28/25  0301      K 4.2   CO2 26      BUN 15   CREATININE 1.0   ALKPHOS 359*   *   *   BILITOT 1.6*       Gtts:   lactated ringers   Intravenous Continuous 75 mL/hr at 07/28/25 0200 Rate Verify at 07/28/25  0200       LDA/Wounds:    Bandar Risk Assessment  Sensory Perception: 3-->slightly limited  Moisture: 3-->occasionally moist  Activity: 1-->bedfast  Mobility: 2-->very limited  Nutrition: 2-->probably inadequate  Friction and Shear: 1-->problem  Bandar Score: 12

## 2025-07-28 NOTE — CONSULTS
07/28/25 1132        Wound 07/27/25 0822 Incontinence associated dermatitis Buttocks #1   Date First Assessed/Time First Assessed: 07/27/25 0822   Present on Original Admission: Yes  Primary Wound Type: Incontinence associated dermatitis  Location: Buttocks  Wound Number: #1  Is this injury device related?: No   Wound Image    Dressing Appearance Open to air   Drainage Amount None   Drainage Characteristics/Odor No odor   Appearance Red;Dry   Periwound Area Dry   Wound Edges Irregular   Care   (miconazole 2% ointment)        Wound 07/27/25 0822 Incontinence associated dermatitis  Groin #2   Date First Assessed/Time First Assessed: 07/27/25 0822   Present on Original Admission: Yes  Primary Wound Type: Incontinence associated dermatitis  Side: (c)   Location: Groin  Wound Number: #2  Is this injury device related?: No   Wound Image     Dressing Appearance Open to air   Drainage Amount None   Drainage Characteristics/Odor Malodorous   Appearance Red;Moist   Care   (nurse cleaned and applied cream previously ordered, requesting change to remedy antifungal ointment)        Wound 07/27/25 0822 Moisture associated dermatitis  Breast #3   Date First Assessed/Time First Assessed: 07/27/25 0822   Present on Original Admission: Yes  Primary Wound Type: Moisture associated dermatitis  Side: (c)   Location: Breast  Wound Number: #3  Is this injury device related?: No   Wound Image    Dressing Appearance Open to air   Drainage Amount None   Appearance Red;Moist   Care   (medicated per nurse)        Wound 07/27/25 0822 Other (comment) Back #6   Date First Assessed/Time First Assessed: 07/27/25 0822   Present on Original Admission: Yes  Primary Wound Type: (c) Other (comment)  Location: Back  Wound Number: #6   Wound Image    Dressing Appearance Open to air   Drainage Amount None   Appearance Red;Dry   Care   (miconazole 2% ointment)        Wound 07/27/25 0822 Other (comment) Right Axilla #8   Date First Assessed/Time First  Assessed: 07/27/25 0822   Present on Original Admission: Yes  Primary Wound Type: Other (comment)  Side: Right  Location: Axilla  Wound Number: #8   Dressing Appearance Open to air   Appearance Red;Moist   Care   (nurse cleaned and applied cream previously ordered)        Wound 07/27/25 0822 Other (comment) Left Axilla #7   Date First Assessed/Time First Assessed: 07/27/25 0822   Present on Original Admission: Yes  Primary Wound Type: Other (comment)  Side: Left  Location: Axilla  Wound Number: #7  Is this injury device related?: No   Dressing Appearance Open to air   Drainage Amount None   Appearance Red;Moist   Care   (nurse cleaned and applied cream previously ordered)   Complete skin assessment heels dry requested aquaphor no pressure wounds.

## 2025-07-29 ENCOUNTER — ANESTHESIA (OUTPATIENT)
Dept: SURGERY | Facility: HOSPITAL | Age: 85
End: 2025-07-29
Payer: MEDICARE

## 2025-07-29 ENCOUNTER — ANESTHESIA EVENT (OUTPATIENT)
Dept: SURGERY | Facility: HOSPITAL | Age: 85
End: 2025-07-29
Payer: MEDICARE

## 2025-07-29 LAB
ABSOLUTE EOSINOPHIL (SMH): 0.2 K/UL
ABSOLUTE MONOCYTE (SMH): 0.81 K/UL (ref 0.3–1)
ABSOLUTE NEUTROPHIL COUNT (SMH): 8.7 K/UL (ref 1.8–7.7)
ALBUMIN SERPL-MCNC: 3.2 G/DL (ref 3.5–5.2)
ALP SERPL-CCNC: 451 UNIT/L (ref 55–135)
ALT SERPL-CCNC: 204 UNIT/L (ref 10–44)
ANION GAP (SMH): 10 MMOL/L (ref 8–16)
AST SERPL-CCNC: 78 UNIT/L (ref 10–40)
BASOPHILS # BLD AUTO: 0.06 K/UL
BASOPHILS NFR BLD AUTO: 0.6 %
BILIRUB SERPL-MCNC: 1.2 MG/DL (ref 0.1–1)
BUN SERPL-MCNC: 11 MG/DL (ref 8–23)
CALCIUM SERPL-MCNC: 9.1 MG/DL (ref 8.7–10.5)
CHLORIDE SERPL-SCNC: 101 MMOL/L (ref 95–110)
CO2 SERPL-SCNC: 27 MMOL/L (ref 23–29)
CREAT SERPL-MCNC: 0.8 MG/DL (ref 0.5–1.4)
ERYTHROCYTE [DISTWIDTH] IN BLOOD BY AUTOMATED COUNT: 12.9 % (ref 11.5–14.5)
GFR SERPLBLD CREATININE-BSD FMLA CKD-EPI: >60 ML/MIN/1.73/M2
GLUCOSE SERPL-MCNC: 147 MG/DL (ref 70–110)
HCT VFR BLD AUTO: 36.6 % (ref 37–48.5)
HGB BLD-MCNC: 11.6 GM/DL (ref 12–16)
IMM GRANULOCYTES # BLD AUTO: 0.07 K/UL (ref 0–0.04)
IMM GRANULOCYTES NFR BLD AUTO: 0.7 % (ref 0–0.5)
LIPASE SERPL-CCNC: 10 U/L (ref 4–60)
LYMPHOCYTES # BLD AUTO: 0.8 K/UL (ref 1–4.8)
MAGNESIUM SERPL-MCNC: 1.7 MG/DL (ref 1.6–2.6)
MCH RBC QN AUTO: 29.9 PG (ref 27–31)
MCHC RBC AUTO-ENTMCNC: 31.7 G/DL (ref 32–36)
MCV RBC AUTO: 94 FL (ref 82–98)
NUCLEATED RBC (/100WBC) (SMH): 0 /100 WBC
PLATELET # BLD AUTO: 179 K/UL (ref 150–450)
PMV BLD AUTO: 9.8 FL (ref 9.2–12.9)
POCT GLUCOSE: 153 MG/DL (ref 70–110)
POCT GLUCOSE: 157 MG/DL (ref 70–110)
POCT GLUCOSE: 162 MG/DL (ref 70–110)
POCT GLUCOSE: 208 MG/DL (ref 70–110)
POTASSIUM SERPL-SCNC: 4.2 MMOL/L (ref 3.5–5.1)
PROT SERPL-MCNC: 5.9 GM/DL (ref 6–8.4)
RBC # BLD AUTO: 3.88 M/UL (ref 4–5.4)
RELATIVE EOSINOPHIL (SMH): 1.9 % (ref 0–8)
RELATIVE LYMPHOCYTE (SMH): 7.5 % (ref 18–48)
RELATIVE MONOCYTE (SMH): 7.6 % (ref 4–15)
RELATIVE NEUTROPHIL (SMH): 81.7 % (ref 38–73)
SODIUM SERPL-SCNC: 138 MMOL/L (ref 136–145)
WBC # BLD AUTO: 10.65 K/UL (ref 3.9–12.7)

## 2025-07-29 PROCEDURE — 83735 ASSAY OF MAGNESIUM: CPT

## 2025-07-29 PROCEDURE — 27202125 HC BALLOON, EXTRACTION (ANY): Performed by: INTERNAL MEDICINE

## 2025-07-29 PROCEDURE — 43264 ERCP REMOVE DUCT CALCULI: CPT | Performed by: INTERNAL MEDICINE

## 2025-07-29 PROCEDURE — 97165 OT EVAL LOW COMPLEX 30 MIN: CPT

## 2025-07-29 PROCEDURE — 37000009 HC ANESTHESIA EA ADD 15 MINS: Performed by: INTERNAL MEDICINE

## 2025-07-29 PROCEDURE — 36415 COLL VENOUS BLD VENIPUNCTURE: CPT

## 2025-07-29 PROCEDURE — 74328 X-RAY BILE DUCT ENDOSCOPY: CPT | Mod: TC | Performed by: INTERNAL MEDICINE

## 2025-07-29 PROCEDURE — 25000003 PHARM REV CODE 250

## 2025-07-29 PROCEDURE — 97535 SELF CARE MNGMENT TRAINING: CPT

## 2025-07-29 PROCEDURE — 43262 ENDO CHOLANGIOPANCREATOGRAPH: CPT | Performed by: INTERNAL MEDICINE

## 2025-07-29 PROCEDURE — 99900035 HC TECH TIME PER 15 MIN (STAT)

## 2025-07-29 PROCEDURE — 37000008 HC ANESTHESIA 1ST 15 MINUTES: Performed by: INTERNAL MEDICINE

## 2025-07-29 PROCEDURE — 63600175 PHARM REV CODE 636 W HCPCS: Performed by: INTERNAL MEDICINE

## 2025-07-29 PROCEDURE — 82962 GLUCOSE BLOOD TEST: CPT | Performed by: INTERNAL MEDICINE

## 2025-07-29 PROCEDURE — 94761 N-INVAS EAR/PLS OXIMETRY MLT: CPT

## 2025-07-29 PROCEDURE — 85025 COMPLETE CBC W/AUTO DIFF WBC: CPT

## 2025-07-29 PROCEDURE — 83690 ASSAY OF LIPASE: CPT

## 2025-07-29 PROCEDURE — 25000003 PHARM REV CODE 250: Performed by: STUDENT IN AN ORGANIZED HEALTH CARE EDUCATION/TRAINING PROGRAM

## 2025-07-29 PROCEDURE — 80053 COMPREHEN METABOLIC PANEL: CPT

## 2025-07-29 PROCEDURE — 97161 PT EVAL LOW COMPLEX 20 MIN: CPT

## 2025-07-29 PROCEDURE — 27000221 HC OXYGEN, UP TO 24 HOURS

## 2025-07-29 PROCEDURE — 63600175 PHARM REV CODE 636 W HCPCS

## 2025-07-29 PROCEDURE — 97116 GAIT TRAINING THERAPY: CPT

## 2025-07-29 PROCEDURE — C1769 GUIDE WIRE: HCPCS | Performed by: INTERNAL MEDICINE

## 2025-07-29 PROCEDURE — 99900031 HC PATIENT EDUCATION (STAT)

## 2025-07-29 PROCEDURE — 11000001 HC ACUTE MED/SURG PRIVATE ROOM

## 2025-07-29 PROCEDURE — 63600175 PHARM REV CODE 636 W HCPCS: Performed by: STUDENT IN AN ORGANIZED HEALTH CARE EDUCATION/TRAINING PROGRAM

## 2025-07-29 PROCEDURE — 0FC98ZZ EXTIRPATION OF MATTER FROM COMMON BILE DUCT, VIA NATURAL OR ARTIFICIAL OPENING ENDOSCOPIC: ICD-10-PCS | Performed by: INTERNAL MEDICINE

## 2025-07-29 PROCEDURE — 0F798ZZ DILATION OF COMMON BILE DUCT, VIA NATURAL OR ARTIFICIAL OPENING ENDOSCOPIC: ICD-10-PCS | Performed by: INTERNAL MEDICINE

## 2025-07-29 RX ORDER — PROPOFOL 10 MG/ML
VIAL (ML) INTRAVENOUS
Status: DISCONTINUED | OUTPATIENT
Start: 2025-07-29 | End: 2025-07-29

## 2025-07-29 RX ORDER — LIDOCAINE HYDROCHLORIDE 20 MG/ML
INJECTION, SOLUTION EPIDURAL; INFILTRATION; INTRACAUDAL; PERINEURAL
Status: DISCONTINUED | OUTPATIENT
Start: 2025-07-29 | End: 2025-07-29

## 2025-07-29 RX ORDER — ONDANSETRON HYDROCHLORIDE 2 MG/ML
INJECTION, SOLUTION INTRAVENOUS
Status: DISCONTINUED | OUTPATIENT
Start: 2025-07-29 | End: 2025-07-29

## 2025-07-29 RX ORDER — FAMOTIDINE 10 MG/ML
INJECTION, SOLUTION INTRAVENOUS
Status: DISCONTINUED | OUTPATIENT
Start: 2025-07-29 | End: 2025-07-29

## 2025-07-29 RX ORDER — ONDANSETRON HYDROCHLORIDE 2 MG/ML
4 INJECTION, SOLUTION INTRAVENOUS DAILY PRN
Status: DISCONTINUED | OUTPATIENT
Start: 2025-07-29 | End: 2025-07-31 | Stop reason: HOSPADM

## 2025-07-29 RX ORDER — HYDRALAZINE HYDROCHLORIDE 20 MG/ML
10 INJECTION INTRAMUSCULAR; INTRAVENOUS EVERY 6 HOURS PRN
Status: DISCONTINUED | OUTPATIENT
Start: 2025-07-29 | End: 2025-07-31 | Stop reason: HOSPADM

## 2025-07-29 RX ORDER — FLUCONAZOLE 100 MG/1
400 TABLET ORAL DAILY
Status: DISCONTINUED | OUTPATIENT
Start: 2025-07-29 | End: 2025-07-31 | Stop reason: HOSPADM

## 2025-07-29 RX ORDER — GLUCAGON 1 MG
1 KIT INJECTION
Status: DISCONTINUED | OUTPATIENT
Start: 2025-07-29 | End: 2025-07-31 | Stop reason: HOSPADM

## 2025-07-29 RX ORDER — ROCURONIUM BROMIDE 10 MG/ML
INJECTION, SOLUTION INTRAVENOUS
Status: DISCONTINUED | OUTPATIENT
Start: 2025-07-29 | End: 2025-07-29

## 2025-07-29 RX ORDER — SUCCINYLCHOLINE CHLORIDE 20 MG/ML
INJECTION INTRAMUSCULAR; INTRAVENOUS
Status: DISCONTINUED | OUTPATIENT
Start: 2025-07-29 | End: 2025-07-29

## 2025-07-29 RX ORDER — SODIUM CHLORIDE, SODIUM LACTATE, POTASSIUM CHLORIDE, CALCIUM CHLORIDE 600; 310; 30; 20 MG/100ML; MG/100ML; MG/100ML; MG/100ML
INJECTION, SOLUTION INTRAVENOUS CONTINUOUS PRN
Status: DISCONTINUED | OUTPATIENT
Start: 2025-07-29 | End: 2025-07-29

## 2025-07-29 RX ADMIN — ONDANSETRON 4 MG: 2 INJECTION INTRAMUSCULAR; INTRAVENOUS at 02:07

## 2025-07-29 RX ADMIN — ACETAMINOPHEN, DEXTROMETHORPHAN HYDROBROMIDE, GUAIFENESIN, AND PHENYLEPHRINE HYDROCHLORIDE: 325; 10; 200; 5 TABLET, COATED ORAL at 11:07

## 2025-07-29 RX ADMIN — MUPIROCIN 1 G: 20 OINTMENT TOPICAL at 08:07

## 2025-07-29 RX ADMIN — SODIUM CHLORIDE, SODIUM LACTATE, POTASSIUM CHLORIDE, AND CALCIUM CHLORIDE: .6; .31; .03; .02 INJECTION, SOLUTION INTRAVENOUS at 02:07

## 2025-07-29 RX ADMIN — INSULIN GLARGINE 10 UNITS: 100 INJECTION, SOLUTION SUBCUTANEOUS at 08:07

## 2025-07-29 RX ADMIN — CEFTRIAXONE 2 G: 2 INJECTION, POWDER, FOR SOLUTION INTRAMUSCULAR; INTRAVENOUS at 01:07

## 2025-07-29 RX ADMIN — ACETAMINOPHEN, DEXTROMETHORPHAN HYDROBROMIDE, GUAIFENESIN, AND PHENYLEPHRINE HYDROCHLORIDE: 325; 10; 200; 5 TABLET, COATED ORAL at 08:07

## 2025-07-29 RX ADMIN — WHITE PETROLATUM 41 % TOPICAL OINTMENT: at 11:07

## 2025-07-29 RX ADMIN — TRIAMCINOLONE ACETONIDE: 1 CREAM TOPICAL at 11:07

## 2025-07-29 RX ADMIN — Medication 200 MG: at 02:07

## 2025-07-29 RX ADMIN — ROCURONIUM BROMIDE 5 MG: 10 INJECTION, SOLUTION INTRAVENOUS at 02:07

## 2025-07-29 RX ADMIN — HYDRALAZINE HYDROCHLORIDE 10 MG: 20 INJECTION INTRAMUSCULAR; INTRAVENOUS at 08:07

## 2025-07-29 RX ADMIN — METRONIDAZOLE 500 MG: 5 INJECTION, SOLUTION INTRAVENOUS at 01:07

## 2025-07-29 RX ADMIN — FAMOTIDINE 20 MG: 10 INJECTION, SOLUTION INTRAVENOUS at 02:07

## 2025-07-29 RX ADMIN — METRONIDAZOLE 500 MG: 5 INJECTION, SOLUTION INTRAVENOUS at 05:07

## 2025-07-29 RX ADMIN — METRONIDAZOLE 500 MG: 5 INJECTION, SOLUTION INTRAVENOUS at 08:07

## 2025-07-29 RX ADMIN — SODIUM CHLORIDE, SODIUM LACTATE, POTASSIUM CHLORIDE, AND CALCIUM CHLORIDE: 600; 310; 30; 20 INJECTION, SOLUTION INTRAVENOUS at 11:07

## 2025-07-29 RX ADMIN — PROPOFOL 100 MG: 10 INJECTION, EMULSION INTRAVENOUS at 02:07

## 2025-07-29 RX ADMIN — TRIAMCINOLONE ACETONIDE: 1 CREAM TOPICAL at 08:07

## 2025-07-29 RX ADMIN — LIDOCAINE HYDROCHLORIDE 100 MG: 20 INJECTION, SOLUTION INTRAVENOUS at 02:07

## 2025-07-29 NOTE — ASSESSMENT & PLAN NOTE
- IVF  - NPO  - CT reviewed-pancreas unremarkable  - CMP daily  - Pain meds PRN  - Nausea meds PRN  - clinically much improved.  Currently NPO for ERCP.  Follow GI recommendations.  Lipase level has normalized.

## 2025-07-29 NOTE — PROGRESS NOTES
Pharmacist Renal Dose Adjustment Note    Maryam White is a 84 y.o. female being treated with Fluconazole    Patient Data:    Vital Signs (Most Recent):  Temp: 98.6 °F (37 °C) (07/29/25 0742)  Pulse: 84 (07/29/25 0812)  Resp: 18 (07/29/25 0742)  BP: (!) 168/83 (07/29/25 0812)  SpO2: 95 % (07/29/25 0743) Vital Signs (72h Range):  Temp:  [97.3 °F (36.3 °C)-99.2 °F (37.3 °C)]   Pulse:  [72-96]   Resp:  [0-37]   BP: ()/(50-97)   SpO2:  [86 %-100 %]      Recent Labs   Lab 07/27/25  0956 07/28/25  0301 07/29/25  0558   CREATININE 1.2 1.0 0.8     Serum creatinine: 0.8 mg/dL 07/29/25 0558  Estimated creatinine clearance: 52.2 mL/min    Fluconazole 100 mg daily will be changed to 400 mg daily for moderate infection per [facility] renal policy.    Pharmacist's Name: Krystal Vasquez  Pharmacist's Extension: [phone]

## 2025-07-29 NOTE — PLAN OF CARE
Problem: Adult Inpatient Plan of Care  Goal: Plan of Care Review  Outcome: Progressing  Goal: Patient-Specific Goal (Individualized)  Outcome: Progressing  Goal: Absence of Hospital-Acquired Illness or Injury  Outcome: Progressing  Goal: Optimal Comfort and Wellbeing  Outcome: Progressing     Problem: Diabetes Comorbidity  Goal: Blood Glucose Level Within Targeted Range  Outcome: Progressing     Problem: Hospitalized Older Adult  Goal: Optimal Cognitive Function  Outcome: Progressing  Goal: Optimal Coping  Outcome: Progressing     Problem: Skin Injury Risk Increased  Goal: Skin Health and Integrity  Outcome: Progressing     Problem: Infection  Goal: Absence of Infection Signs and Symptoms  Outcome: Progressing     Problem: Wound  Goal: Absence of Infection Signs and Symptoms  Outcome: Progressing  Goal: Skin Health and Integrity  Outcome: Progressing

## 2025-07-29 NOTE — ANESTHESIA POSTPROCEDURE EVALUATION
Anesthesia Post Evaluation    Patient: Maryam White    Procedure(s) Performed: Procedure(s) (LRB):  ERCP (ENDOSCOPIC RETROGRADE CHOLANGIOPANCREATOGRAPHY) (N/A)    Final Anesthesia Type: general      Patient location during evaluation: PACU  Patient participation: Yes- Able to Participate  Level of consciousness: awake and alert  Post-procedure vital signs: reviewed and stable  Pain management: adequate  Airway patency: patent    PONV status at discharge: No PONV  Anesthetic complications: no      Cardiovascular status: blood pressure returned to baseline and stable  Respiratory status: unassisted and room air  Hydration status: euvolemic  Follow-up not needed.              Vitals Value Taken Time   /74 07/29/25 15:33   Temp 36.4 °C (97.5 °F) 07/29/25 15:08   Pulse 86 07/29/25 15:35   Resp 19 07/29/25 15:35   SpO2 96 % 07/29/25 15:35   Vitals shown include unfiled device data.      No case tracking events are documented in the log.      Pain/Em Score: Pain Rating Post Med Admin: 7 (7/28/2025 12:00 AM)  Em Score: 9 (7/29/2025  3:30 PM)

## 2025-07-29 NOTE — [employer]ATION PATIENT INSTRUCTIONS
Discharge Summary/Instructions after an Endoscopic Procedure  Patient Name: Maryam White  Patient MRN: [id number]  Patient YOB: 1940 Tuesday, July 29, 2025  Rambo Rutledge III, MD  RESTRICTIONS:  During your procedure today, you received medications for sedation.  These   medications may affect your judgment, balance and coordination.  Therefore,   for 24 hours, you have the following restrictions:   - DO NOT drive a car, operate machinery, make legal/financial decisions,   sign important papers or drink alcohol.    ACTIVITY:  Today: no heavy lifting, straining or running due to procedural   sedation/anesthesia.  The following day: return to full activity including work.  DIET:  Eat and drink normally unless instructed otherwise.     TREATMENT FOR COMMON SIDE EFFECTS:  - Mild abdominal pain, nausea, belching, bloating or excessive gas:  rest,   eat lightly and use a heating pad.  - Sore Throat: treat with throat lozenges and/or gargle with warm salt   water.  - Because air was used during the procedure, expelling large amounts of air   from your rectum or belching is normal.  - If a bowel prep was taken, you may not have a bowel movement for 1-3 days.    This is normal.  SYMPTOMS TO WATCH FOR AND REPORT TO YOUR PHYSICIAN:  1. Abdominal pain or bloating, other than gas cramps.  2. Chest pain.  3. Back pain.  4. Signs of infection such as: chills or fever occurring within 24 hours   after the procedure.  5. Rectal bleeding, which would show as bright red, maroon, or black stools.   (A tablespoon of blood from the rectum is not serious, especially if   hemorrhoids are present.)  6. Vomiting.  7. Weakness or dizziness.  GO DIRECTLY TO THE NEAREST EMERGENCY ROOM IF YOU HAVE ANY OF THE FOLLOWING:      Difficulty breathing              Chills and/or fever over 101 F   Persistent vomiting and/or vomiting blood   Severe abdominal pain   Severe chest pain   Black, tarry stools   Bleeding- more than one  tablespoon   Any other symptom or condition that you feel may need urgent attention  Your doctor recommends these additional instructions:  If any biopsies were taken, your doctors clinic will contact you in 1 to 2   weeks with any results.  - Advance diet as tolerated.   - Continue present medications.   - Patient has a contact number available for emergencies.  The signs and   symptoms of potential delayed complications were discussed with the   patient.  Return to normal activities tomorrow.  Written discharge   instructions were provided to the patient.   - Return patient to [facility] for ongoing care.  For questions, problems or results please call your physician - Rambo Rutledge III, MD at Work:  [phone].  [facility], EMERGENCY ROOM PHONE NUMBER: [phone]  IF A COMPLICATION OR EMERGENCY SITUATION ARISES AND YOU ARE UNABLE TO REACH   YOUR PHYSICIAN - GO DIRECTLY TO THE EMERGENCY ROOM.  Rambo Rutledge III, MD  7/29/2025 2:56:34 PM  This report has been verified and signed electronically.  Dear patient,  As a result of recent federal legislation (The Federal Cures Act), you may   receive lab or pathology results from your procedure in your [facility]   account before your physician is able to contact you. Your physician or   their representative will relay the results to you with their   recommendations at their soonest availability.  Thank you,  PROVATION

## 2025-07-29 NOTE — NURSING
End of shift Summary- patient had a ERCP done today with dr Rutledge. Patient ok to resume diet. Rodríguez catheter remains in place due to sacral wound.

## 2025-07-29 NOTE — SUBJECTIVE & OBJECTIVE
Interval History:  Patient is seen and examined during multidisciplinary rounds.  Patient is currently NPO and afebrile.  Patient is scheduled to undergo ERCP today.  Patient is on intravenous ceftriaxone and Flagyl antibiotics.  Patient receiving Diflucan and topical antifungals for abdominal folds candidiasis.  Patient has significant perineal fungal rash due to which patient is receiving Rodríguez catheter to avoid excessive moisture.  Family members present at bedside, answered all questions.  Patient denies any abdominal pain, nausea or vomiting at this time.    Review of Systems  Objective:     Vital Signs (Most Recent):  Temp: 98.6 °F (37 °C) (07/29/25 0742)  Pulse: 84 (07/29/25 0742)  Resp: 18 (07/29/25 0742)  BP: (!) 175/97 (reported to dr antonio via secure chat) (07/29/25 0742)  SpO2: 95 % (07/29/25 0742) Vital Signs (24h Range):  Temp:  [97.3 °F (36.3 °C)-99.2 °F (37.3 °C)] 98.6 °F (37 °C)  Pulse:  [81-90] 84  Resp:  [14-29] 18  SpO2:  [90 %-97 %] 95 %  BP: (107-183)/(55-97) 175/97     Weight: 88.7 kg (195 lb 8.8 oz)  Body mass index is 35.77 kg/m².    Intake/Output Summary (Last 24 hours) at 7/29/2025 0800  Last data filed at 7/29/2025 0514  Gross per 24 hour   Intake --   Output 1725 ml   Net -1725 ml         Physical Exam  Constitutional:       General: She is not in acute distress.     Appearance: Normal appearance. She is not toxic-appearing.   HENT:      Head: Normocephalic.      Mouth/Throat:      Mouth: Mucous membranes are moist.   Cardiovascular:      Rate and Rhythm: Normal rate and regular rhythm.      Pulses: Normal pulses.   Pulmonary:      Effort: Pulmonary effort is normal.      Breath sounds: Normal breath sounds.   Abdominal:      General: Abdomen is flat. There is no distension.      Palpations: Abdomen is soft.      Tenderness: There is no abdominal tenderness.   Musculoskeletal:         General: No swelling.   Skin:     Findings: Rash present.      Comments: Psoriasis, candida under skin  folds near breast and groin   Neurological:      General: No focal deficit present.      Mental Status: She is alert. Mental status is at baseline.      Comments: Dementia at baseline   Psychiatric:         Mood and Affect: Mood normal.               Significant Labs:   Lab Results   Component Value Date    WBC 10.65 07/29/2025    HGB 11.6 (L) 07/29/2025    HCT 36.6 (L) 07/29/2025    MCV 94 07/29/2025     07/29/2025       CMP  Sodium   Date Value Ref Range Status   07/29/2025 138 136 - 145 mmol/L Final     Potassium   Date Value Ref Range Status   07/29/2025 4.2 3.5 - 5.1 mmol/L Final     Chloride   Date Value Ref Range Status   07/29/2025 101 95 - 110 mmol/L Final     CO2   Date Value Ref Range Status   07/29/2025 27 23 - 29 mmol/L Final     Glucose   Date Value Ref Range Status   07/29/2025 147 (H) 70 - 110 mg/dL Final     BUN   Date Value Ref Range Status   07/29/2025 11 8 - 23 mg/dL Final     Creatinine   Date Value Ref Range Status   07/29/2025 0.8 0.5 - 1.4 mg/dL Final     Calcium   Date Value Ref Range Status   07/29/2025 9.1 8.7 - 10.5 mg/dL Final     Protein Total   Date Value Ref Range Status   07/29/2025 5.9 (L) 6.0 - 8.4 gm/dL Final     Albumin   Date Value Ref Range Status   07/29/2025 3.2 (L) 3.5 - 5.2 g/dL Final     Bilirubin Total   Date Value Ref Range Status   07/29/2025 1.2 (H) 0.1 - 1.0 mg/dL Final     Comment:     For infants and newborns, interpretation of results should be based   on gestational age, weight and in agreement with clinical   observations.    Premature Infant recommended reference ranges:   0-24 hours:  <8.0 mg/dL   24-48 hours: <12.0 mg/dL   3-5 days:    <15.0 mg/dL   6-29 days:   <15.0 mg/dL     ALP   Date Value Ref Range Status   07/29/2025 451 (H) 55 - 135 unit/L Final     AST   Date Value Ref Range Status   07/29/2025 78 (H) 10 - 40 unit/L Final     ALT   Date Value Ref Range Status   07/29/2025 204 (H) 10 - 44 unit/L Final     Anion Gap   Date Value Ref Range Status  "  07/29/2025 10 8 - 16 mmol/L Final     eGFR   Date Value Ref Range Status   07/29/2025 >60 >60 mL/min/1.73/m2 Final     No results found for: "INR", "PROTIME"  Microbiology Results (last 7 days)       ** No results found for the last 168 hours. **          Significant Imaging:   MRCP:  1.  Prior cholecystectomy.  2.  Mildly prominent CBD (just above normal for age) and small intraluminal filling defects in the periampullary CBD suggestive of tiny stones (consider correlation with ERCP and or sphincterotomy).  3.  Minimal trace bilateral pleural effusions and basilar atelectasis.  4.  Additional, and incidental findings as above.    CXR:  1.  Minimal interstitial opacity at the left lung base suggestive of atelectasis, scarring and less likely mild bronchitis or atypical infection/viral pneumonia.  2.  Trace bilateral pleural effusions and adjacent atelectasis.  "

## 2025-07-29 NOTE — PLAN OF CARE
Nursing Transfer Note      Reason patient is being transferred: PACU 05    Transfer To: 1216    Transfer via bed    Transfer with 3L NC to O2, cardiac monitoring    Transported by MAHAMED Pimentel     Medicines sent: None    Any special needs or follow-up needed: Routine Care    Chart send with patient: Yes    Notified: son, grandson - at the bedside    Patient reassessed at: 7/29/2025 5526 (date, time)     MAHAMED Smyth came to the bedside to assess patient.

## 2025-07-29 NOTE — CARE UPDATE
07/28/25 1936   Patient Assessment/Suction   Level of Consciousness (AVPU) responds to voice   Respiratory Effort Normal;Unlabored   All Lung Fields Breath Sounds clear   Skin Integrity   $ Wound Care Tech Time 15 min   Area Observed Left;Right;Behind ear   Skin Appearance without discoloration   PRE-TX-O2   Device (Oxygen Therapy) nasal cannula   Flow (L/min) (Oxygen Therapy) 2   SpO2 96 %   $ Pulse Oximetry - Multiple Charge Pulse Oximetry - Multiple   Pulse 82   Resp (!) 21   Aerosol Therapy   $ Aerosol Therapy Charges PRN treatment not required   Education   $ Education Bronchodilator;Oxygen;15 min

## 2025-07-29 NOTE — PROGRESS NOTES
"[facility]  Progress Note    Patient Name: Maryam White  MRN: [id number]  Patient Class: IP- Inpatient   Admission Date: 7/27/2025  Length of Stay: 2 days  Attending Physician: Jen Menezes MD  Primary Care Provider: Adan Jarrell NP        Subjective     Principal Problem:Pancreatitis        HPI:  As per  note:    84 year old female with PMHx of HTN, HLD, DMII, GERD and prior cholecystectomy with 2 different ERCP's after (most recently in January 2023 where she had multiple stones in CBD) who presented to [place] ED with abdominal pain and rigors. Labs showed elevated lipase 233, tbili 2.3,  and . WBC 1.9, Hb/Hct 13.3/39.9. Elevated LA 3.9 , got IV fluids and improved to 2.6. Gotten 2.5L of IVF. CT shows pancreas unremarkable, "mild prominence of the intra and extrahepatic ducts, likely due to post cholecystectomy changes." U/A negative for UTI. Blood cultures in process. Given IV CTX initially and then broadened to IV zosyn BP: 99/50 HR 87 on 2L NC. Transfer requested for further evaluated by GI.   Records not seen in EPIC.    At the time of my exam, she did not have any active complaints. Pleasantly confused with her baseline dementia. Son and daughter in law at bedside, they mentioned she did have gallstones in the past and underwent ERCP and does have history of pancreatitis.  Only complaint she had at home was nausea.   Admitting to ICU given no bed availability on floor. Repeat labs pending at the time of admission  GI consulted and ordered MRCP        Overview/Hospital Course:  84-year-old female with history of prior cholecystectomy, prior ERCP with multiple stones and CBD, presented with abdominal pain.  She was closely monitored after hospitalization.  LFTs were elevated.  CT abdomen at outside hospital showed mild prominence of intra and extrahepatic ducts, pancreas unremarkable.  Lactic acid as well as elevated and improved with IV fluids.  She " was started on IV ceftriaxone and IV Flagyl.  MRCP showed mildly prominent CBD with periampullary defects suggestive of stones.  Gastroenterology planned ERCP 7/29.  Meanwhile she was kept on clear liquid diet and tolerated well.  Patient's caretaker and son Kofi requested a phone call with update while he is not here/as patient has dementia.    Interval History:  Patient is seen and examined during multidisciplinary rounds.  Patient is currently NPO and afebrile.  Patient is scheduled to undergo ERCP today.  Patient is on intravenous ceftriaxone and Flagyl antibiotics.  Patient receiving Diflucan and topical antifungals for abdominal folds candidiasis.  Patient has significant perineal fungal rash due to which patient is receiving Rodríguez catheter to avoid excessive moisture.  Family members present at bedside, answered all questions.  Patient denies any abdominal pain, nausea or vomiting at this time.    Review of Systems  Objective:     Vital Signs (Most Recent):  Temp: 98.6 °F (37 °C) (07/29/25 0742)  Pulse: 84 (07/29/25 0742)  Resp: 18 (07/29/25 0742)  BP: (!) 175/97 (reported to dr antonio via secure chat) (07/29/25 0742)  SpO2: 95 % (07/29/25 0742) Vital Signs (24h Range):  Temp:  [97.3 °F (36.3 °C)-99.2 °F (37.3 °C)] 98.6 °F (37 °C)  Pulse:  [81-90] 84  Resp:  [14-29] 18  SpO2:  [90 %-97 %] 95 %  BP: (107-183)/(55-97) 175/97     Weight: 88.7 kg (195 lb 8.8 oz)  Body mass index is 35.77 kg/m².    Intake/Output Summary (Last 24 hours) at 7/29/2025 0800  Last data filed at 7/29/2025 0514  Gross per 24 hour   Intake --   Output 1725 ml   Net -1725 ml         Physical Exam  Constitutional:       General: She is not in acute distress.     Appearance: Normal appearance. She is not toxic-appearing.   HENT:      Head: Normocephalic.      Mouth/Throat:      Mouth: Mucous membranes are moist.   Cardiovascular:      Rate and Rhythm: Normal rate and regular rhythm.      Pulses: Normal pulses.   Pulmonary:      Effort:  Pulmonary effort is normal.      Breath sounds: Normal breath sounds.   Abdominal:      General: Abdomen is flat. There is no distension.      Palpations: Abdomen is soft.      Tenderness: There is no abdominal tenderness.   Musculoskeletal:         General: No swelling.   Skin:     Findings: Rash present.      Comments: Psoriasis, candida under skin folds near breast and groin   Neurological:      General: No focal deficit present.      Mental Status: She is alert. Mental status is at baseline.      Comments: Dementia at baseline   Psychiatric:         Mood and Affect: Mood normal.               Significant Labs:   Lab Results   Component Value Date    WBC 10.65 07/29/2025    HGB 11.6 (L) 07/29/2025    HCT 36.6 (L) 07/29/2025    MCV 94 07/29/2025     07/29/2025       CMP  Sodium   Date Value Ref Range Status   07/29/2025 138 136 - 145 mmol/L Final     Potassium   Date Value Ref Range Status   07/29/2025 4.2 3.5 - 5.1 mmol/L Final     Chloride   Date Value Ref Range Status   07/29/2025 101 95 - 110 mmol/L Final     CO2   Date Value Ref Range Status   07/29/2025 27 23 - 29 mmol/L Final     Glucose   Date Value Ref Range Status   07/29/2025 147 (H) 70 - 110 mg/dL Final     BUN   Date Value Ref Range Status   07/29/2025 11 8 - 23 mg/dL Final     Creatinine   Date Value Ref Range Status   07/29/2025 0.8 0.5 - 1.4 mg/dL Final     Calcium   Date Value Ref Range Status   07/29/2025 9.1 8.7 - 10.5 mg/dL Final     Protein Total   Date Value Ref Range Status   07/29/2025 5.9 (L) 6.0 - 8.4 gm/dL Final     Albumin   Date Value Ref Range Status   07/29/2025 3.2 (L) 3.5 - 5.2 g/dL Final     Bilirubin Total   Date Value Ref Range Status   07/29/2025 1.2 (H) 0.1 - 1.0 mg/dL Final     Comment:     For infants and newborns, interpretation of results should be based   on gestational age, weight and in agreement with clinical   observations.    Premature Infant recommended reference ranges:   0-24 hours:  <8.0 mg/dL   24-48  "hours: <12.0 mg/dL   3-5 days:    <15.0 mg/dL   6-29 days:   <15.0 mg/dL     ALP   Date Value Ref Range Status   07/29/2025 451 (H) 55 - 135 unit/L Final     AST   Date Value Ref Range Status   07/29/2025 78 (H) 10 - 40 unit/L Final     ALT   Date Value Ref Range Status   07/29/2025 204 (H) 10 - 44 unit/L Final     Anion Gap   Date Value Ref Range Status   07/29/2025 10 8 - 16 mmol/L Final     eGFR   Date Value Ref Range Status   07/29/2025 >60 >60 mL/min/1.73/m2 Final     No results found for: "INR", "PROTIME"  Microbiology Results (last 7 days)       ** No results found for the last 168 hours. **          Significant Imaging:   MRCP:  1.  Prior cholecystectomy.  2.  Mildly prominent CBD (just above normal for age) and small intraluminal filling defects in the periampullary CBD suggestive of tiny stones (consider correlation with ERCP and or sphincterotomy).  3.  Minimal trace bilateral pleural effusions and basilar atelectasis.  4.  Additional, and incidental findings as above.    CXR:  1.  Minimal interstitial opacity at the left lung base suggestive of atelectasis, scarring and less likely mild bronchitis or atypical infection/viral pneumonia.  2.  Trace bilateral pleural effusions and adjacent atelectasis.      Assessment & Plan  Pancreatitis  - IVF  - NPO  - CT reviewed-pancreas unremarkable  - CMP daily  - Pain meds PRN  - Nausea meds PRN  - clinically much improved.  Currently NPO for ERCP.  Follow GI recommendations.  Lipase level has normalized.    Elevated LFTs  - MRCP shows possible gallstones  - cholecystectomy in the past  - ERCP scheduled 7/29    Depressive disorder  Aware  Home meds      Essential hypertension  Patient's blood pressure range in the last 24 hours was: BP  Min: 107/57  Max: 178/69.The patient's inpatient anti-hypertensive regimen is listed below:  Current Antihypertensives  losartan split tablet 12.5 mg, Daily, Oral  hydrALAZINE injection 10 mg, Every 6 hours PRN, " Intravenous    Plan  - restart home meds at a lower dose and watch closely/adjust  Hyperlipidemia  Hold statin with elevated LFT    Type 2 diabetes mellitus with other specified complication  - Insulin glargine and sliding scale adjust based on BG and diet tolerance  - POCT glucose  - A1C eight    Primary insomnia  Home Trazodone    Lactic acidosis  IVF  Repeat lactate normal  Follow cultures  IV Ceftriaxone and Flagyl empirically    Psoriasis  - With yeast infection ( refer to pics)  - Topical triamcinolone at home  - Topical miconazole  - oral Fluconazole 100 mg    Discussed with bedside nurse and .  Resume anticoagulation therapy for DVT prophylaxis tomorrow after ERCP.  VTE Risk Mitigation (From admission, onward)           Ordered     IP VTE HIGH RISK PATIENT  Once         07/27/25 0851     Place sequential compression device  Until discontinued         07/27/25 0851                    Discharge Planning   SARI: 7/31/2025     Code Status: Full Code   Medical Readiness for Discharge Date:   Discharge Plan A: Home Health   Discharge Delays: None known at this time              Please place Justification for DME      Jen Menezes MD  Department of Hospital Medicine   [facility]

## 2025-07-29 NOTE — TRANSFER OF CARE
"Anesthesia Transfer of Care Note    Patient: Maryam White    Procedure(s) Performed: Procedure(s) (LRB):  ERCP (ENDOSCOPIC RETROGRADE CHOLANGIOPANCREATOGRAPHY) (N/A)    Patient location: PACU    Anesthesia Type: general    Transport from OR: Transported from OR on 2-3 L/min O2 by NC with adequate spontaneous ventilation    Post pain: adequate analgesia    Post assessment: no apparent anesthetic complications and tolerated procedure well    Post vital signs: stable    Level of consciousness: awake and alert    Nausea/Vomiting: no nausea/vomiting    Complications: none    Transfer of care protocol was followed    Last vitals: Visit Vitals  BP (!) 164/74   Pulse 89   Temp 36.7 °C (98.1 °F) (Oral)   Resp 16   Ht 5' 2" (1.575 m)   Wt 88.7 kg (195 lb 8.8 oz)   LMP  (LMP Unknown)   SpO2 98%   BMI 35.77 kg/m²     "

## 2025-07-29 NOTE — PT/OT/SLP EVAL
Occupational Therapy   Evaluation    Name: Maryam White  MRN: [id number]  Admitting Diagnosis: Pancreatitis  Recent Surgery: Procedure(s) (LRB):  ERCP (ENDOSCOPIC RETROGRADE CHOLANGIOPANCREATOGRAPHY) (N/A)      Recommendations:     Discharge Recommendations: Low Intensity Therapy ([facility] 24/7 supervision)  Discharge Equipment Recommendations:  none  Barriers to discharge:  None    Assessment:     Maryam White is a 84 y.o. female with a medical diagnosis of Pancreatitis.  Pt agreeable to OT evaluation this AM. Performance deficits affecting function: weakness, impaired endurance, impaired self care skills, impaired functional mobility, gait instability, impaired balance, decreased safety awareness, decreased upper extremity function, decreased lower extremity function, impaired cardiopulmonary response to activity.      Rehab Prognosis: Good; patient would benefit from acute skilled OT services to address these deficits and reach maximum level of function.       Plan:     Patient to be seen 3 x/week to address the above listed problems via self-care/home management, therapeutic activities, therapeutic exercises  Plan of Care Expires: 08/29/25  Plan of Care Reviewed with: patient, family    Subjective     Chief Complaint: none stated  Patient/Family Comments/goals: none stated    Occupational Profile:  Living Environment: Pt lives with son and DIL in a 1 story home. Pt has a WIS with a shower chair  Previous level of function: Sup with ADLs and mobility; Pt requires assist with shower transfer; Son and DIL compete IADLs.  Roles and Routines: mother  Equipment Used at Home: shower chair, walker, rolling, rollator, oxygen  Assistance upon Discharge: yes, from family    Pain/Comfort:  Pain Rating 1: 0/10    Patients cultural, spiritual, Rastafarian conflicts given the current situation:      Objective:     Communicated with: nursing prior to session.  Patient found HOB elevated with bed alarm, peripheral IV, oxygen, telemetry,  lamar catheter upon OT entry to room.    General Precautions: Standard, fall  Orthopedic Precautions: N/A  Braces: N/A  Respiratory Status: Nasal cannula, flow 2 L/min    Occupational Performance:    Bed Mobility:    Patient completed Supine to Sit with minimum assistance  Patient completed Sit to Supine with contact guard assistance    Functional Mobility/Transfers:  Patient completed Sit <> Stand Transfer with minimum assistance  with  rolling walker   Functional Mobility: pt took a few side steps with minimum assist with RW with no LOB or SOB    Activities of Daily Living:  Grooming: setup assistance bed level to wash face    Toileting: lamar      Cognitive/Visual Perceptual:  Cognitive/Psychosocial Skills:     -       Follows Commands/attention:Follows one-step commands  -       Communication: clear/fluent  -       Safety awareness/insight to disability: impaired   -       Mood/Affect/Coping skills/emotional control: Appropriate to situation, Cooperative, and Pleasant    Physical Exam:  Balance:    -       SBA seated balance; Min A standing balance  Upper Extremity Range of Motion:     -       Right Upper Extremity: WFL  -       Left Upper Extremity: WFL  Upper Extremity Strength:    -       Right Upper Extremity: WFL  -       Left Upper Extremity: WFL   Strength:    -       Right Upper Extremity: WFL  -       Left Upper Extremity: WFL  Fine Motor Coordination:    -       Intact  Gross motor coordination:   WFL    AMPAC 6 Click ADL:  AMPAC Total Score: 17    Treatment & Education:  Pt educated on role of OT/POC, importance of OOB/EOB activity, use of call bell, and safety during ADLs, transfers, and functional mobility.    Patient left HOB elevated with all lines intact, call button in reach, bed alarm on, and family present    GOALS:   Multidisciplinary Problems       Occupational Therapy Goals          Problem: Occupational Therapy    Goal Priority Disciplines Outcome Interventions   Occupational Therapy  Goal     OT, PT/OT     Description: Goals to be met by: 25     Patient will increase functional independence with ADLs by performing:    UE Dressing with Set-up Assistance.  LE Dressing with Set-up Assistance.  Grooming while seated with Supervision.  Toileting from toilet with Supervision for hygiene and clothing management.   Toilet transfer to toilet with Supervision.                           History:     Past Medical History:   Diagnosis Date    Bell's palsy     Cataract     Depression     Diabetes mellitus, type 2     30+    Hypertension     Pancreatitis     x 3    Psoriasis     Skin cancer     Uses walker          Past Surgical History:   Procedure Laterality Date    APPENDECTOMY       SECTION       SECTION      COLONOSCOPY      ERCP      ERCP N/A 2023    Procedure: ERCP (ENDOSCOPIC RETROGRADE CHOLANGIOPANCREATOGRAPHY);  Surgeon: Rambo Rutledge III, MD;  Location: [facility];  Service: Endoscopy;  Laterality: N/A;    GALLBLADDER SURGERY      SKIN CANCER EXCISION      TONSILLECTOMY         Time Tracking:     OT Date of Treatment: 25  OT Start Time: 1025  OT Stop Time: 1048  OT Total Time (min): 23 min    Billable Minutes:Evaluation 10  Self Care/Home Management 13    2025

## 2025-07-29 NOTE — PT/OT/SLP EVAL
Physical Therapy Evaluation    Patient Name:  Maryam White   MRN:  [id number]    Recommendations:     Discharge Recommendations: Low Intensity Therapy (24/7 supervision)   Discharge Equipment Recommendations: to be determined by next level of care   Barriers to discharge: medical status    Assessment:     Maryam White is a 84 y.o. female admitted with a medical diagnosis of Pancreatitis.  She presents with the following impairments/functional limitations: weakness, impaired endurance, impaired self care skills, impaired functional mobility, gait instability, impaired balance, impaired cognition, decreased lower extremity function, decreased safety awareness, impaired cardiopulmonary response to activity Patient agreed to PT evaluation. Patient needed repeated verbal and tactile cues to follow task command. Patient given cues for hand placement with transfers and for RW placement and technique. Patient fatigued with transfers and walking.     Rehab Prognosis: Good; patient would benefit from acute skilled PT services to address these deficits and reach maximum level of function.    Recent Surgery: Procedure(s) (LRB):  ERCP (ENDOSCOPIC RETROGRADE CHOLANGIOPANCREATOGRAPHY) (N/A)      Plan:     During this hospitalization, patient to be seen 6 x/week to address the identified rehab impairments via gait training, therapeutic activities, therapeutic exercises, neuromuscular re-education and progress toward the following goals:    Plan of Care Expires:  08/29/25    Subjective     Chief Complaint: do I have to?  Patient/Family Comments/goals: to return home with family  Pain/Comfort:  Pain Rating 1: 0/10    Patients cultural, spiritual, [employer] conflicts given the current situation:      Living Environment:  Lives with son and his family. Daughter in law assists with dressing, bathing, toileting  Prior to admission, patients level of function was assistance required all aspects of care.  Equipment used at home: shower  chair, walker, standard, walker, rolling, rollator.  DME owned (not currently used): none.  Upon discharge, patient will have assistance from family.    Objective:     Communicated with nurse prior to session.  Patient found HOB elevated with bed alarm, peripheral IV, oxygen, telemetry, lamar catheter  upon PT entry to room.    General Precautions: Standard, fall  Orthopedic Precautions:N/A   Braces: N/A  Respiratory Status: Nasal cannula, flow 2 L/min    Exams:  Cognitive Exam:  Patient is oriented to Person and Place  RLE ROM: WFL  RLE Strength: WFL  LLE ROM: WFL  LLE Strength: WFL    Functional Mobility:  Bed Mobility:     Supine to Sit: moderate assistance  Sit to Supine: moderate assistance  Transfers:     Sit to Stand:  moderate assistance with rolling walker  Gait: 3 ft forward/backward RW Jerald x 2trials with seated rest break       AM-PAC 6 CLICK MOBILITY  Total Score:13       Treatment & Education:  Pt educated on POC, discharge recommendation, need for assist with mobility, use of call bell to seek assistance as needed and fall prevention  Gait belt provided to family with education of safe use.    Patient left HOB elevated with all lines intact, call button in reach, bed alarm on, and nurse, family present.    GOALS:   Multidisciplinary Problems       Physical Therapy Goals          Problem: Physical Therapy    Goal Priority Disciplines Outcome Interventions   Physical Therapy Goal     PT, PT/OT     Description: Goals to be met by: 25     Patient will increase functional independence with mobility by performin. Supine to sit with Stand-by Assistance  2. Sit to supine with Stand-by Assistance  3. Sit to stand transfer with Contact Guard Assistance  4. Bed to chair transfer with Contact Guard Assistance using Rolling Walker  5. Gait  x 50 feet with Minimal Assistance using Rolling Walker.                          DME Justifications:  No DME recommended requiring DME justifications    History:      Past Medical History:   Diagnosis Date    Bell's palsy     Cataract     Depression     Diabetes mellitus, type 2     30+    Hypertension     Pancreatitis     x 3    Psoriasis     Skin cancer     Uses walker        Past Surgical History:   Procedure Laterality Date    APPENDECTOMY       SECTION       SECTION      COLONOSCOPY      ERCP      ERCP N/A 2023    Procedure: ERCP (ENDOSCOPIC RETROGRADE CHOLANGIOPANCREATOGRAPHY);  Surgeon: Rambo Rutledge III, MD;  Location: [facility];  Service: Endoscopy;  Laterality: N/A;    GALLBLADDER SURGERY      SKIN CANCER EXCISION      TONSILLECTOMY         Time Tracking:     PT Received On: 25  PT Start Time: 1105     PT Stop Time: 1133  PT Total Time (min): 28 min     Billable Minutes: Evaluation 10 and Gait Training 18      2025

## 2025-07-29 NOTE — PLAN OF CARE
07/29/25 0905   Rounds   Attendance Provider;Nurse ;Pharmacist;Assigned nurse   Discharge Plan A Home Health

## 2025-07-29 NOTE — ASSESSMENT & PLAN NOTE
Patient's blood pressure range in the last 24 hours was: BP  Min: 107/57  Max: 178/69.The patient's inpatient anti-hypertensive regimen is listed below:  Current Antihypertensives  losartan split tablet 12.5 mg, Daily, Oral  hydrALAZINE injection 10 mg, Every 6 hours PRN, Intravenous    Plan  - restart home meds at a lower dose and watch closely/adjust

## 2025-07-29 NOTE — ANESTHESIA PROCEDURE NOTES
Intubation    Date/Time: 7/29/2025 2:32 PM    Performed by: Renetta Fritz CRNA  Authorized by: Adi Tate MD    Intubation:     Induction:  Intravenous    Intubated:  Postinduction    Mask Ventilation:  Easy mask    Attempts:  1    Attempted By:  CRNA    Method of Intubation:  Video laryngoscopy    Blade:  Anderson 3    Laryngeal View Grade: Grade I - full view of cords      Difficult Airway Encountered?: No      Complications:  None    Airway Device:  Oral endotracheal tube    Airway Device Size:  7.0    Style/Cuff Inflation:  Cuffed    Secured at:  The lips    Placement Verified By:  Capnometry    Complicating Factors:  None    Findings Post-Intubation:  BS equal bilateral and atraumatic/condition of teeth unchanged

## 2025-07-29 NOTE — ANESTHESIA PREPROCEDURE EVALUATION
2025  Maryam White is a 84 y.o., female.      Pre-op Assessment    I have reviewed the Patient Summary Reports.     I have reviewed the Nursing Notes. I have reviewed the NPO Status.   I have reviewed the Medications.     Review of Systems  Anesthesia Hx:  No problems with previous Anesthesia             Denies Family Hx of Anesthesia complications.    Denies Personal Hx of Anesthesia complications.                    Social:  Non-Smoker, No Alcohol Use       Hematology/Oncology:    Oncology Normal    -- Anemia:                                  EENT/Dental:  EENT/Dental Normal           Cardiovascular:     Hypertension              ECG has been reviewed.                            Pulmonary:  Pulmonary Normal                       Renal/:  Renal/ Normal                 Hepatic/GI:     GERD   Choledocholithiasis.  Transaminitis.             Musculoskeletal:  Arthritis               Neurological:    Neuromuscular disease: [place] palsy.            Dementia                         Endocrine:  Diabetes, type 2           Dermatological:  Skin Normal Psoriasis   Psych:    depression                Patient Active Problem List   Diagnosis    Depressive disorder    Essential hypertension    Gastroesophageal reflux disease    Hyperlipidemia    Psoriasis    Type 2 diabetes mellitus with other specified complication    Cognitive impairment    Polyarticular osteoarthritis    Primary insomnia    Pancreatitis    Lactic acidosis    Elevated LFTs       Past Surgical History:   Procedure Laterality Date    APPENDECTOMY       SECTION       SECTION      COLONOSCOPY      ERCP      ERCP N/A 2023    Procedure: ERCP (ENDOSCOPIC RETROGRADE CHOLANGIOPANCREATOGRAPHY);  Surgeon: Rambo Rutledge III, MD;  Location: [facility];  Service: Endoscopy;  Laterality: N/A;    GALLBLADDER SURGERY      SKIN CANCER  EXCISION  2023    TONSILLECTOMY          Tobacco Use:  The patient  reports that she has never smoked. She has never used smokeless tobacco.     Results for orders placed or performed during the hospital encounter of 01/13/23   EKG 12-lead    Collection Time: 01/13/23  1:36 PM    Narrative    Test Reason : Z01.818,    Vent. Rate : 078 BPM     Atrial Rate : 078 BPM     P-R Int : 144 ms          QRS Dur : 080 ms      QT Int : 390 ms       P-R-T Axes : 047 045 030 degrees     QTc Int : 444 ms    Normal sinus rhythm  Normal ECG  No previous ECGs available  Confirmed by Sarwat Dhillon MD ([id number]) on 1/16/2023 8:37:32 PM    Referred By:             Confirmed By:Sarwat Dhillon MD             Lab Results   Component Value Date    WBC 10.65 07/29/2025    HGB 11.6 (L) 07/29/2025    HCT 36.6 (L) 07/29/2025    MCV 94 07/29/2025     07/29/2025     BMP  Lab Results   Component Value Date     07/29/2025    K 4.2 07/29/2025     07/29/2025    CO2 27 07/29/2025    BUN 11 07/29/2025    CREATININE 0.8 07/29/2025    CALCIUM 9.1 07/29/2025    ANIONGAP 10 07/29/2025     (H) 07/29/2025     (H) 07/28/2025     (H) 07/27/2025       No results found for this or any previous visit.          Physical Exam  General: Well nourished, Alert, Cooperative and Oriented    Airway:  Mallampati: III   Mouth Opening: Normal  TM Distance: Normal  Tongue: Normal  Neck ROM: Normal ROM    Dental:  Dentures    Chest/Lungs:  Clear to auscultation, Normal Respiratory Rate    Heart:  Rate: Normal  Rhythm: Regular Rhythm  Sounds: Normal        Anesthesia Plan  Type of Anesthesia, risks & benefits discussed:    Anesthesia Type: Gen ETT  Intra-op Monitoring Plan: Standard ASA Monitors  Induction:  IV  Informed Consent: Informed consent signed with the Patient and all parties understand the risks and agree with anesthesia plan.  All questions answered. Patient consented to blood products? Yes  ASA Score: 3    Ready For Surgery  From Anesthesia Perspective.     .

## 2025-07-30 LAB
ABSOLUTE EOSINOPHIL (SMH): 0.25 K/UL
ABSOLUTE MONOCYTE (SMH): 0.66 K/UL (ref 0.3–1)
ABSOLUTE NEUTROPHIL COUNT (SMH): 7 K/UL (ref 1.8–7.7)
ALBUMIN SERPL-MCNC: 3.3 G/DL (ref 3.5–5.2)
ALP SERPL-CCNC: 392 UNIT/L (ref 55–135)
ALT SERPL-CCNC: 132 UNIT/L (ref 10–44)
ANION GAP (SMH): 7 MMOL/L (ref 8–16)
AST SERPL-CCNC: 32 UNIT/L (ref 10–40)
BASOPHILS # BLD AUTO: 0.08 K/UL
BASOPHILS NFR BLD AUTO: 0.9 %
BILIRUB SERPL-MCNC: 0.8 MG/DL (ref 0.1–1)
BUN SERPL-MCNC: 9 MG/DL (ref 8–23)
CALCIUM SERPL-MCNC: 9 MG/DL (ref 8.7–10.5)
CHLORIDE SERPL-SCNC: 102 MMOL/L (ref 95–110)
CO2 SERPL-SCNC: 29 MMOL/L (ref 23–29)
CREAT SERPL-MCNC: 0.7 MG/DL (ref 0.5–1.4)
ERYTHROCYTE [DISTWIDTH] IN BLOOD BY AUTOMATED COUNT: 13 % (ref 11.5–14.5)
GFR SERPLBLD CREATININE-BSD FMLA CKD-EPI: >60 ML/MIN/1.73/M2
GLUCOSE SERPL-MCNC: 166 MG/DL (ref 70–110)
HCT VFR BLD AUTO: 37.4 % (ref 37–48.5)
HGB BLD-MCNC: 11.9 GM/DL (ref 12–16)
IMM GRANULOCYTES # BLD AUTO: 0.05 K/UL (ref 0–0.04)
IMM GRANULOCYTES NFR BLD AUTO: 0.6 % (ref 0–0.5)
LYMPHOCYTES # BLD AUTO: 1.03 K/UL (ref 1–4.8)
MAGNESIUM SERPL-MCNC: 1.7 MG/DL (ref 1.6–2.6)
MCH RBC QN AUTO: 30 PG (ref 27–31)
MCHC RBC AUTO-ENTMCNC: 31.8 G/DL (ref 32–36)
MCV RBC AUTO: 94 FL (ref 82–98)
NUCLEATED RBC (/100WBC) (SMH): 0 /100 WBC
PLATELET # BLD AUTO: 179 K/UL (ref 150–450)
PMV BLD AUTO: 11.1 FL (ref 9.2–12.9)
POCT GLUCOSE: 172 MG/DL (ref 70–110)
POCT GLUCOSE: 196 MG/DL (ref 70–110)
POCT GLUCOSE: 280 MG/DL (ref 70–110)
POCT GLUCOSE: 306 MG/DL (ref 70–110)
POCT GLUCOSE: 322 MG/DL (ref 70–110)
POTASSIUM SERPL-SCNC: 3.7 MMOL/L (ref 3.5–5.1)
PROT SERPL-MCNC: 6 GM/DL (ref 6–8.4)
RBC # BLD AUTO: 3.97 M/UL (ref 4–5.4)
RELATIVE EOSINOPHIL (SMH): 2.8 % (ref 0–8)
RELATIVE LYMPHOCYTE (SMH): 11.4 % (ref 18–48)
RELATIVE MONOCYTE (SMH): 7.3 % (ref 4–15)
RELATIVE NEUTROPHIL (SMH): 77 % (ref 38–73)
SODIUM SERPL-SCNC: 138 MMOL/L (ref 136–145)
WBC # BLD AUTO: 9.03 K/UL (ref 3.9–12.7)

## 2025-07-30 PROCEDURE — 94640 AIRWAY INHALATION TREATMENT: CPT

## 2025-07-30 PROCEDURE — 97530 THERAPEUTIC ACTIVITIES: CPT | Mod: CQ

## 2025-07-30 PROCEDURE — 99900035 HC TECH TIME PER 15 MIN (STAT)

## 2025-07-30 PROCEDURE — 63600175 PHARM REV CODE 636 W HCPCS

## 2025-07-30 PROCEDURE — 97116 GAIT TRAINING THERAPY: CPT | Mod: CQ

## 2025-07-30 PROCEDURE — 11000001 HC ACUTE MED/SURG PRIVATE ROOM

## 2025-07-30 PROCEDURE — 27000221 HC OXYGEN, UP TO 24 HOURS

## 2025-07-30 PROCEDURE — 85025 COMPLETE CBC W/AUTO DIFF WBC: CPT

## 2025-07-30 PROCEDURE — 25000003 PHARM REV CODE 250

## 2025-07-30 PROCEDURE — 25000242 PHARM REV CODE 250 ALT 637 W/ HCPCS

## 2025-07-30 PROCEDURE — 36415 COLL VENOUS BLD VENIPUNCTURE: CPT

## 2025-07-30 PROCEDURE — 80053 COMPREHEN METABOLIC PANEL: CPT

## 2025-07-30 PROCEDURE — 94761 N-INVAS EAR/PLS OXIMETRY MLT: CPT

## 2025-07-30 PROCEDURE — 97110 THERAPEUTIC EXERCISES: CPT

## 2025-07-30 PROCEDURE — 63700000 PHARM REV CODE 250 ALT 637 W/O HCPCS: Performed by: INTERNAL MEDICINE

## 2025-07-30 PROCEDURE — 83735 ASSAY OF MAGNESIUM: CPT

## 2025-07-30 RX ADMIN — WHITE PETROLATUM 41 % TOPICAL OINTMENT: at 08:07

## 2025-07-30 RX ADMIN — TRIAMCINOLONE ACETONIDE: 1 CREAM TOPICAL at 08:07

## 2025-07-30 RX ADMIN — Medication 800 MG: at 02:07

## 2025-07-30 RX ADMIN — Medication 800 MG: at 10:07

## 2025-07-30 RX ADMIN — TRIAMCINOLONE ACETONIDE: 1 CREAM TOPICAL at 09:07

## 2025-07-30 RX ADMIN — METRONIDAZOLE 500 MG: 5 INJECTION, SOLUTION INTRAVENOUS at 06:07

## 2025-07-30 RX ADMIN — ACETAMINOPHEN, DEXTROMETHORPHAN HYDROBROMIDE, GUAIFENESIN, AND PHENYLEPHRINE HYDROCHLORIDE: 325; 10; 200; 5 TABLET, COATED ORAL at 09:07

## 2025-07-30 RX ADMIN — CEFTRIAXONE 2 G: 2 INJECTION, POWDER, FOR SOLUTION INTRAMUSCULAR; INTRAVENOUS at 12:07

## 2025-07-30 RX ADMIN — INSULIN GLARGINE 10 UNITS: 100 INJECTION, SOLUTION SUBCUTANEOUS at 09:07

## 2025-07-30 RX ADMIN — POTASSIUM BICARBONATE 50 MEQ: 977.5 TABLET, EFFERVESCENT ORAL at 02:07

## 2025-07-30 RX ADMIN — ALBUTEROL SULFATE 2.5 MG: 2.5 SOLUTION RESPIRATORY (INHALATION) at 09:07

## 2025-07-30 RX ADMIN — MUPIROCIN 1 G: 20 OINTMENT TOPICAL at 08:07

## 2025-07-30 RX ADMIN — LOSARTAN POTASSIUM 12.5 MG: 25 TABLET, FILM COATED ORAL at 08:07

## 2025-07-30 RX ADMIN — METRONIDAZOLE 500 MG: 5 INJECTION, SOLUTION INTRAVENOUS at 01:07

## 2025-07-30 RX ADMIN — INSULIN ASPART 4 UNITS: 100 INJECTION, SOLUTION INTRAVENOUS; SUBCUTANEOUS at 06:07

## 2025-07-30 RX ADMIN — METRONIDAZOLE 500 MG: 5 INJECTION, SOLUTION INTRAVENOUS at 10:07

## 2025-07-30 RX ADMIN — Medication 6 MG: at 09:07

## 2025-07-30 RX ADMIN — ACETAMINOPHEN, DEXTROMETHORPHAN HYDROBROMIDE, GUAIFENESIN, AND PHENYLEPHRINE HYDROCHLORIDE: 325; 10; 200; 5 TABLET, COATED ORAL at 08:07

## 2025-07-30 RX ADMIN — ALBUTEROL SULFATE 2.5 MG: 2.5 SOLUTION RESPIRATORY (INHALATION) at 10:07

## 2025-07-30 RX ADMIN — INSULIN ASPART 2 UNITS: 100 INJECTION, SOLUTION INTRAVENOUS; SUBCUTANEOUS at 09:07

## 2025-07-30 RX ADMIN — FLUCONAZOLE 400 MG: 100 TABLET ORAL at 08:07

## 2025-07-30 RX ADMIN — MUPIROCIN 1 G: 20 OINTMENT TOPICAL at 09:07

## 2025-07-30 RX ADMIN — DULOXETINE 60 MG: 30 CAPSULE, DELAYED RELEASE ORAL at 08:07

## 2025-07-30 RX ADMIN — PANTOPRAZOLE SODIUM 40 MG: 40 TABLET, DELAYED RELEASE ORAL at 05:07

## 2025-07-30 RX ADMIN — SODIUM CHLORIDE, SODIUM LACTATE, POTASSIUM CHLORIDE, AND CALCIUM CHLORIDE: 600; 310; 30; 20 INJECTION, SOLUTION INTRAVENOUS at 03:07

## 2025-07-30 NOTE — SUBJECTIVE & OBJECTIVE
Interval History:  Patient is seen and examined during multidisciplinary rounds.  Patient successfully underwent ERCP with CBD stone removal.  Patient is on intravenous ceftriaxone and Flagyl antibiotics.  Patient receiving Diflucan and topical antifungals for abdominal folds candidiasis.  Patient has significant perineal fungal rash due to which patient is receiving Rodríguez catheter to avoid excessive moisture.  Family members present at bedside, answered all questions.  Patient denies any abdominal pain, nausea or vomiting at this time.  Patient agreeable to go home with home health and home PT tomorrow.    Review of Systems  Objective:     Vital Signs (Most Recent):  Temp: 97.8 °F (36.6 °C) (07/30/25 0315)  Pulse: 72 (07/30/25 0315)  Resp: 17 (07/30/25 0315)  BP: (!) 152/74 (07/30/25 0315)  SpO2: 95 % (07/30/25 0315) Vital Signs (24h Range):  Temp:  [97.5 °F (36.4 °C)-98.6 °F (37 °C)] 97.8 °F (36.6 °C)  Pulse:  [68-94] 72  Resp:  [16-20] 17  SpO2:  [93 %-98 %] 95 %  BP: (151-175)/(68-97) 152/74     Weight: 88.7 kg (195 lb 8.8 oz)  Body mass index is 35.77 kg/m².    Intake/Output Summary (Last 24 hours) at 7/30/2025 0717  Last data filed at 7/30/2025 0444  Gross per 24 hour   Intake 3633.24 ml   Output 3320 ml   Net 313.24 ml         Physical Exam  Constitutional:       General: She is not in acute distress.     Appearance: Normal appearance. She is not toxic-appearing.   HENT:      Head: Normocephalic.      Mouth/Throat:      Mouth: Mucous membranes are moist.   Cardiovascular:      Rate and Rhythm: Normal rate and regular rhythm.      Pulses: Normal pulses.   Pulmonary:      Effort: Pulmonary effort is normal.      Breath sounds: Normal breath sounds.   Abdominal:      General: Abdomen is flat. There is no distension.      Palpations: Abdomen is soft.      Tenderness: There is no abdominal tenderness.   Musculoskeletal:         General: No swelling.   Skin:     Findings: Rash present.      Comments: Psoriasis,  candida under skin folds near breast and groin   Neurological:      General: No focal deficit present.      Mental Status: She is alert. Mental status is at baseline.      Comments: Dementia at baseline   Psychiatric:         Mood and Affect: Mood normal.               Significant Labs:   Lab Results   Component Value Date    WBC 10.65 07/29/2025    HGB 11.6 (L) 07/29/2025    HCT 36.6 (L) 07/29/2025    MCV 94 07/29/2025     07/29/2025       CMP  Sodium   Date Value Ref Range Status   07/29/2025 138 136 - 145 mmol/L Final     Potassium   Date Value Ref Range Status   07/29/2025 4.2 3.5 - 5.1 mmol/L Final     Chloride   Date Value Ref Range Status   07/29/2025 101 95 - 110 mmol/L Final     CO2   Date Value Ref Range Status   07/29/2025 27 23 - 29 mmol/L Final     Glucose   Date Value Ref Range Status   07/29/2025 147 (H) 70 - 110 mg/dL Final     BUN   Date Value Ref Range Status   07/29/2025 11 8 - 23 mg/dL Final     Creatinine   Date Value Ref Range Status   07/29/2025 0.8 0.5 - 1.4 mg/dL Final     Calcium   Date Value Ref Range Status   07/29/2025 9.1 8.7 - 10.5 mg/dL Final     Protein Total   Date Value Ref Range Status   07/29/2025 5.9 (L) 6.0 - 8.4 gm/dL Final     Albumin   Date Value Ref Range Status   07/29/2025 3.2 (L) 3.5 - 5.2 g/dL Final     Bilirubin Total   Date Value Ref Range Status   07/29/2025 1.2 (H) 0.1 - 1.0 mg/dL Final     Comment:     For infants and newborns, interpretation of results should be based   on gestational age, weight and in agreement with clinical   observations.    Premature Infant recommended reference ranges:   0-24 hours:  <8.0 mg/dL   24-48 hours: <12.0 mg/dL   3-5 days:    <15.0 mg/dL   6-29 days:   <15.0 mg/dL     ALP   Date Value Ref Range Status   07/29/2025 451 (H) 55 - 135 unit/L Final     AST   Date Value Ref Range Status   07/29/2025 78 (H) 10 - 40 unit/L Final     ALT   Date Value Ref Range Status   07/29/2025 204 (H) 10 - 44 unit/L Final     Anion Gap   Date  "Value Ref Range Status   07/29/2025 10 8 - 16 mmol/L Final     eGFR   Date Value Ref Range Status   07/29/2025 >60 >60 mL/min/1.73/m2 Final     No results found for: "INR", "PROTIME"  Microbiology Results (last 7 days)       ** No results found for the last 168 hours. **          Significant Imaging:   MRCP:  1.  Prior cholecystectomy.  2.  Mildly prominent CBD (just above normal for age) and small intraluminal filling defects in the periampullary CBD suggestive of tiny stones (consider correlation with ERCP and or sphincterotomy).  3.  Minimal trace bilateral pleural effusions and basilar atelectasis.  4.  Additional, and incidental findings as above.    CXR:  1.  Minimal interstitial opacity at the left lung base suggestive of atelectasis, scarring and less likely mild bronchitis or atypical infection/viral pneumonia.  2.  Trace bilateral pleural effusions and adjacent atelectasis.    ERCP:  - The entire main bile duct was moderately                          dilated, with a stone causing an obstruction.                          - The patient has had a cholecystectomy.                          - Choledocholithiasis was found.                          - A biliary sphincterotomy was performed.                          - The biliary tree was swept.   "

## 2025-07-30 NOTE — PT/OT/SLP PROGRESS
Occupational Therapy   Treatment    Name: Maryam White  MRN: [id number]  Admitting Diagnosis:  Pancreatitis  1 Day Post-Op    Recommendations:     Discharge Recommendations: Low Intensity Therapy ([facility] 24/7 supervision)  Discharge Equipment Recommendations:  none  Barriers to discharge:  None    Assessment:     Maryam White is a 84 y.o. female with a medical diagnosis of Pancreatitis. Pt agreeable to limited OT therapy session this AM. Performance deficits affecting function are weakness, impaired endurance, impaired self care skills, impaired functional mobility, gait instability, impaired balance, decreased safety awareness, impaired cognition, decreased upper extremity function, decreased lower extremity function, impaired cardiopulmonary response to activity.     Rehab Prognosis:  Fair; patient would benefit from acute skilled OT services to address these deficits and reach maximum level of function.       Plan:     Patient to be seen 3 x/week to address the above listed problems via self-care/home management, therapeutic activities, therapeutic exercises  Plan of Care Expires: 08/29/25  Plan of Care Reviewed with: patient    Subjective     Chief Complaint: fatigued from PT and getting cleaned up by nursing  Patient/Family Comments/goals: none stated  Pain/Comfort:  Pain Rating 1: 0/10    Objective:     Communicated with: nursing prior to session.  Patient found HOB elevated with telemetry, oxygen, lamar catheter, peripheral IV, bed alarm upon OT entry to room.    General Precautions: Standard, fall    Orthopedic Precautions:N/A  Braces: N/A  Respiratory Status: Nasal cannula, flow 3 L/min     Occupational Performance:     Bed Mobility:    declined    Functional Mobility/Transfers:  declined    Activities of Daily Living:  Declined    Therapeutic Exercise:  BUE ROM exercises x 10 reps each in all major planes to improve pt's strength and endurance needed for ADLs. Pt tolerated well    Treatment & Education:  Pt  educated on role of OT/POC, importance of OOB/EOB activity, use of call bell, and safety during ADLs, transfers, and functional mobility.    Patient left HOB elevated with all lines intact, call button in reach, and bed alarm on    GOALS:   Multidisciplinary Problems       Occupational Therapy Goals          Problem: Occupational Therapy    Goal Priority Disciplines Outcome Interventions   Occupational Therapy Goal     OT, PT/OT     Description: Goals to be met by: 8/29/25     Patient will increase functional independence with ADLs by performing:    UE Dressing with Set-up Assistance.  LE Dressing with Set-up Assistance.  Grooming while seated with Supervision.  Toileting from toilet with Supervision for hygiene and clothing management.   Toilet transfer to toilet with Supervision.                           Time Tracking:     OT Date of Treatment: 07/30/25  OT Start Time: 1152  OT Stop Time: 1202  OT Total Time (min): 10 min    Billable Minutes:Therapeutic Exercise 10    OT/RODNEY: OT          7/30/2025

## 2025-07-30 NOTE — RESPIRATORY THERAPY
07/29/25 1953   Patient Assessment/Suction   Level of Consciousness (AVPU) alert   Respiratory Effort Normal;Unlabored   Expansion/Accessory Muscles/Retractions no use of accessory muscles;no retractions;expansion symmetric   Rhythm/Pattern, Respiratory unlabored;pattern regular;depth regular;no shortness of breath reported   Skin Integrity   $ Wound Care Tech Time 15 min   Area Observed Left;Right;Behind ear;Cheek;Upper lip;Nares   Skin Appearance without discoloration   PRE-TX-O2   Device (Oxygen Therapy) nasal cannula   Flow (L/min) (Oxygen Therapy) 3   SpO2 95 %   Pulse Oximetry Type Intermittent   $ Pulse Oximetry - Multiple Charge Pulse Oximetry - Multiple   Pulse 74   Resp 18   Aerosol Therapy   $ Aerosol Therapy Charges PRN treatment not required   Daily Review of Necessity (SVN) completed   Respiratory Treatment Status (SVN) PRN treatment not required   Education   $ Education 15 min;Oxygen   Respiratory Evaluation   $ Care Plan Tech Time 15 min

## 2025-07-30 NOTE — PLAN OF CARE
Problem: Adult Inpatient Plan of Care  Goal: Plan of Care Review  Outcome: Progressing  Goal: Patient-Specific Goal (Individualized)  Outcome: Progressing  Goal: Absence of Hospital-Acquired Illness or Injury  Outcome: Progressing  Goal: Optimal Comfort and Wellbeing  Outcome: Progressing     Problem: Hospitalized Older Adult  Goal: Optimal Cognitive Function  Outcome: Progressing  Goal: Optimal Coping  Outcome: Progressing  Goal: Effective Urinary Elimination  Outcome: Progressing     Problem: Skin Injury Risk Increased  Goal: Skin Health and Integrity  Outcome: Progressing     Problem: Infection  Goal: Absence of Infection Signs and Symptoms  Outcome: Progressing     Problem: Wound  Goal: Skin Health and Integrity  Outcome: Progressing

## 2025-07-30 NOTE — ASSESSMENT & PLAN NOTE
- MRCP shows possible gallstones  - cholecystectomy in the past  - status post ERCP July 29, 2025.

## 2025-07-30 NOTE — PLAN OF CARE
Problem: Adult Inpatient Plan of Care  Goal: Plan of Care Review  Outcome: Progressing     Problem: Diabetes Comorbidity  Goal: Blood Glucose Level Within Targeted Range  Outcome: Progressing     Problem: Skin Injury Risk Increased  Goal: Skin Health and Integrity  Outcome: Progressing     Problem: Fall Injury Risk  Goal: Absence of Fall and Fall-Related Injury  Outcome: Progressing     Patient remains free of injury/fall. Denies pain. Monitoring glucose. Repositioned q2hrs.

## 2025-07-30 NOTE — PT/OT/SLP PROGRESS
Physical Therapy Treatment    Patient Name:  Maryam White   MRN:  [id number]    Recommendations:     Discharge Recommendations: Low Intensity Therapy (24/7 supervision)  Discharge Equipment Recommendations: to be determined by next level of care  Barriers to discharge: None    Assessment:     Maryam White is a 84 y.o. female admitted with a medical diagnosis of Pancreatitis.  She presents with the following impairments/functional limitations: weakness, impaired endurance, impaired self care skills, impaired functional mobility, gait instability, impaired balance, decreased lower extremity function, decreased safety awareness, impaired cardiopulmonary response to activity .    Rehab Prognosis: Fair; patient would benefit from acute skilled PT services to address these deficits and reach maximum level of function.    Recent Surgery: Procedure(s) (LRB):  ERCP (ENDOSCOPIC RETROGRADE CHOLANGIOPANCREATOGRAPHY) (N/A) 1 Day Post-Op    Plan:     During this hospitalization, patient to be seen 6 x/week to address the identified rehab impairments via gait training, therapeutic activities, therapeutic exercises, neuromuscular re-education and progress toward the following goals:    Plan of Care Expires:  08/29/25    Subjective     Chief Complaint: LBP following gait trial  Patient/Family Comments/goals: Pt agreeable to PT.   Pain/Comfort:  Pain Rating 1: 0/10  Location - Orientation 1: lower  Location 1: back  Pain Addressed 1: Cessation of Activity  Pain Rating Post-Intervention 1: other (see comments) (minimal)      Objective:     Communicated with RN prior to session.  Patient found HOB elevated with bed alarm, peripheral IV, telemetry, oxygen, lamar catheter upon PT entry to room.     General Precautions: Standard, fall  Orthopedic Precautions: N/A  Braces: N/A  Respiratory Status: Nasal cannula, flow 2 L/min     Functional Mobility:  Bed Mobility:     Supine to Sit: minimum assistance  Transfers:     Sit to Stand:  minimum  assistance with rolling walker  Gait: 30 feet CGA with rw      AM-PAC 6 CLICK MOBILITY          Treatment & Education:  Pt was educated on the following: call light use, importance of OOB activity and functional mobility to negate the negative effects of prolonged bed rest during this hospitalization, safe transfers/ambulation and discharge planning recommendations/options.     Pt performed 2 sets of 5 reps during sit to stand activity to increase strength of all major LE musculature.     Patient left HOB elevated with all lines intact, call button in reach, and bed alarm on..    GOALS:   Multidisciplinary Problems       Physical Therapy Goals          Problem: Physical Therapy    Goal Priority Disciplines Outcome Interventions   Physical Therapy Goal     PT, PT/OT     Description: Goals to be met by: 25     Patient will increase functional independence with mobility by performin. Supine to sit with Stand-by Assistance  2. Sit to supine with Stand-by Assistance  3. Sit to stand transfer with Contact Guard Assistance  4. Bed to chair transfer with Contact Guard Assistance using Rolling Walker  5. Gait  x 50 feet with Minimal Assistance using Rolling Walker.                              Time Tracking:     PT Received On: 25  PT Start Time: 1110     PT Stop Time: 1133  PT Total Time (min): 23 min     Billable Minutes: Gait Training 10 and Therapeutic Activity 13    Treatment Type: Treatment  PT/PTA: PTA     Number of PTA visits since last PT visit: 2025

## 2025-07-30 NOTE — ASSESSMENT & PLAN NOTE
- resolved.  Resume low-fat diet.  ERCP results reviewed.  Follow GI recommendations.  Continue supportive care with antiemetics and pain medication.  Currently on IV ceftriaxone and Flagyl for empiric treatment for cholangitis.

## 2025-07-30 NOTE — ASSESSMENT & PLAN NOTE
Patient's blood pressure range in the last 24 hours was: BP  Min: 149/94  Max: 173/77.The patient's inpatient anti-hypertensive regimen is listed below:  Current Antihypertensives  losartan split tablet 12.5 mg, Daily, Oral  hydrALAZINE injection 10 mg, Every 6 hours PRN, Intravenous    Plan  - restart home meds at a lower dose and watch closely/adjust

## 2025-07-30 NOTE — PROGRESS NOTES
"Critical access hospital Medicine  Progress Note    Patient Name: Maryam White  MRN: [id number]  Patient Class: IP- Inpatient   Admission Date: 7/27/2025  Length of Stay: 3 days  Attending Physician: Jen Menezes MD  Primary Care Provider: Adan Jarrell NP        Subjective     Principal Problem:Pancreatitis    HPI:  As per  note:    84 year old female with PMHx of HTN, HLD, DMII, GERD and prior cholecystectomy with 2 different ERCP's after (most recently in January 2023 where she had multiple stones in CBD) who presented to [facility] with abdominal pain and rigors. Labs showed elevated lipase 233, tbili 2.3,  and . WBC 1.9, Hb/Hct 13.3/39.9. Elevated LA 3.9 , got IV fluids and improved to 2.6. Gotten 2.5L of IVF. CT shows pancreas unremarkable, "mild prominence of the intra and extrahepatic ducts, likely due to post cholecystectomy changes." U/A negative for UTI. Blood cultures in process. Given IV CTX initially and then broadened to IV zosyn BP: 99/50 HR 87 on 2L NC. Transfer requested for further evaluated by GI.   Records not seen in EPIC.    At the time of my exam, she did not have any active complaints. Pleasantly confused with her baseline dementia. Son and daughter in law at bedside, they mentioned she did have gallstones in the past and underwent ERCP and does have history of pancreatitis.  Only complaint she had at home was nausea.   Admitting to ICU given no bed availability on floor. Repeat labs pending at the time of admission  GI consulted and ordered MRCP        Overview/Hospital Course:  84-year-old female with history of prior cholecystectomy, prior ERCP with multiple stones and CBD, presented with abdominal pain.  She was closely monitored after hospitalization.  LFTs were elevated.  CT abdomen at outside hospital showed mild prominence of intra and extrahepatic ducts, pancreas unremarkable.  Lactic acid as well as elevated and improved with IV fluids.  She was " started on IV ceftriaxone and IV Flagyl.  MRCP showed mildly prominent CBD with periampullary defects suggestive of stones.  Gastroenterology planned ERCP 7/29.  Meanwhile she was kept on clear liquid diet and tolerated well.  Patient's caretaker and son Kofi requested a phone call with update while he is not here/as patient has dementia.    Interval History:  Patient is seen and examined during multidisciplinary rounds.  Patient successfully underwent ERCP with CBD stone removal.  Patient is on intravenous ceftriaxone and Flagyl antibiotics.  Patient receiving Diflucan and topical antifungals for abdominal folds candidiasis.  Patient has significant perineal fungal rash due to which patient is receiving Rodríguez catheter to avoid excessive moisture.  Family members present at bedside, answered all questions.  Patient denies any abdominal pain, nausea or vomiting at this time.  Patient agreeable to go home with home health and home PT tomorrow.    Review of Systems  Objective:     Vital Signs (Most Recent):  Temp: 97.8 °F (36.6 °C) (07/30/25 0315)  Pulse: 72 (07/30/25 0315)  Resp: 17 (07/30/25 0315)  BP: (!) 152/74 (07/30/25 0315)  SpO2: 95 % (07/30/25 0315) Vital Signs (24h Range):  Temp:  [97.5 °F (36.4 °C)-98.6 °F (37 °C)] 97.8 °F (36.6 °C)  Pulse:  [68-94] 72  Resp:  [16-20] 17  SpO2:  [93 %-98 %] 95 %  BP: (151-175)/(68-97) 152/74     Weight: 88.7 kg (195 lb 8.8 oz)  Body mass index is 35.77 kg/m².    Intake/Output Summary (Last 24 hours) at 7/30/2025 0717  Last data filed at 7/30/2025 0444  Gross per 24 hour   Intake 3633.24 ml   Output 3320 ml   Net 313.24 ml         Physical Exam  Constitutional:       General: She is not in acute distress.     Appearance: Normal appearance. She is not toxic-appearing.   HENT:      Head: Normocephalic.      Mouth/Throat:      Mouth: Mucous membranes are moist.   Cardiovascular:      Rate and Rhythm: Normal rate and regular rhythm.      Pulses: Normal pulses.   Pulmonary:       Effort: Pulmonary effort is normal.      Breath sounds: Normal breath sounds.   Abdominal:      General: Abdomen is flat. There is no distension.      Palpations: Abdomen is soft.      Tenderness: There is no abdominal tenderness.   Musculoskeletal:         General: No swelling.   Skin:     Findings: Rash present.      Comments: Psoriasis, candida under skin folds near breast and groin   Neurological:      General: No focal deficit present.      Mental Status: She is alert. Mental status is at baseline.      Comments: Dementia at baseline   Psychiatric:         Mood and Affect: Mood normal.               Significant Labs:   Lab Results   Component Value Date    WBC 10.65 07/29/2025    HGB 11.6 (L) 07/29/2025    HCT 36.6 (L) 07/29/2025    MCV 94 07/29/2025     07/29/2025       CMP  Sodium   Date Value Ref Range Status   07/29/2025 138 136 - 145 mmol/L Final     Potassium   Date Value Ref Range Status   07/29/2025 4.2 3.5 - 5.1 mmol/L Final     Chloride   Date Value Ref Range Status   07/29/2025 101 95 - 110 mmol/L Final     CO2   Date Value Ref Range Status   07/29/2025 27 23 - 29 mmol/L Final     Glucose   Date Value Ref Range Status   07/29/2025 147 (H) 70 - 110 mg/dL Final     BUN   Date Value Ref Range Status   07/29/2025 11 8 - 23 mg/dL Final     Creatinine   Date Value Ref Range Status   07/29/2025 0.8 0.5 - 1.4 mg/dL Final     Calcium   Date Value Ref Range Status   07/29/2025 9.1 8.7 - 10.5 mg/dL Final     Protein Total   Date Value Ref Range Status   07/29/2025 5.9 (L) 6.0 - 8.4 gm/dL Final     Albumin   Date Value Ref Range Status   07/29/2025 3.2 (L) 3.5 - 5.2 g/dL Final     Bilirubin Total   Date Value Ref Range Status   07/29/2025 1.2 (H) 0.1 - 1.0 mg/dL Final     Comment:     For infants and newborns, interpretation of results should be based   on gestational age, weight and in agreement with clinical   observations.    Premature Infant recommended reference ranges:   0-24 hours:  <8.0 mg/dL  "  24-48 hours: <12.0 mg/dL   3-5 days:    <15.0 mg/dL   6-29 days:   <15.0 mg/dL     ALP   Date Value Ref Range Status   07/29/2025 451 (H) 55 - 135 unit/L Final     AST   Date Value Ref Range Status   07/29/2025 78 (H) 10 - 40 unit/L Final     ALT   Date Value Ref Range Status   07/29/2025 204 (H) 10 - 44 unit/L Final     Anion Gap   Date Value Ref Range Status   07/29/2025 10 8 - 16 mmol/L Final     eGFR   Date Value Ref Range Status   07/29/2025 >60 >60 mL/min/1.73/m2 Final     No results found for: "INR", "PROTIME"  Microbiology Results (last 7 days)       ** No results found for the last 168 hours. **          Significant Imaging:   MRCP:  1.  Prior cholecystectomy.  2.  Mildly prominent CBD (just above normal for age) and small intraluminal filling defects in the periampullary CBD suggestive of tiny stones (consider correlation with ERCP and or sphincterotomy).  3.  Minimal trace bilateral pleural effusions and basilar atelectasis.  4.  Additional, and incidental findings as above.    CXR:  1.  Minimal interstitial opacity at the left lung base suggestive of atelectasis, scarring and less likely mild bronchitis or atypical infection/viral pneumonia.  2.  Trace bilateral pleural effusions and adjacent atelectasis.    ERCP:  - The entire main bile duct was moderately                          dilated, with a stone causing an obstruction.                          - The patient has had a cholecystectomy.                          - Choledocholithiasis was found.                          - A biliary sphincterotomy was performed.                          - The biliary tree was swept.       Assessment & Plan  Pancreatitis  - resolved.  Resume low-fat diet.  ERCP results reviewed.  Follow GI recommendations.  Continue supportive care with antiemetics and pain medication.  Currently on IV ceftriaxone and Flagyl for empiric treatment for cholangitis.    Elevated LFTs  - MRCP shows possible gallstones  - cholecystectomy in " the past  - status post ERCP July 29, 2025.    Depressive disorder  Aware  Home meds      Essential hypertension  Patient's blood pressure range in the last 24 hours was: BP  Min: 149/94  Max: 173/77.The patient's inpatient anti-hypertensive regimen is listed below:  Current Antihypertensives  losartan split tablet 12.5 mg, Daily, Oral  hydrALAZINE injection 10 mg, Every 6 hours PRN, Intravenous    Plan  - restart home meds at a lower dose and watch closely/adjust  Hyperlipidemia  Hold statin with elevated LFT    Type 2 diabetes mellitus with other specified complication  Check blood glucose level q AC/HS.  Use Novolog Insulin Sliding Scale as needed.   Continue American Diabetic Association 1800 Kcal diet.    Primary insomnia  Home Trazodone    Lactic acidosis  IVF  Repeat lactate normal  Follow cultures  IV Ceftriaxone and Flagyl empirically    Psoriasis  - With yeast infection ( refer to pics)  - Topical triamcinolone at home  - Topical miconazole  - oral Fluconazole 100 mg    Discussed with patient's family member, answered all questions.  Discussed with bedside nurse and .  Continue PT and OT.  Likely DC home with home health tomorrow.  VTE Risk Mitigation (From admission, onward)           Ordered     IP VTE HIGH RISK PATIENT  Once         07/27/25 0851     Place sequential compression device  Until discontinued         07/27/25 0851                    Discharge Planning   SARI: 7/31/2025     Code Status: Full Code   Medical Readiness for Discharge Date:   Discharge Plan A: Home Health   Discharge Delays: None known at this time              Please place Justification for DME      Jen Menezes MD  Department of Hospital Medicine   [facility]

## 2025-07-31 VITALS
SYSTOLIC BLOOD PRESSURE: 177 MMHG | DIASTOLIC BLOOD PRESSURE: 83 MMHG | HEART RATE: 68 BPM | HEIGHT: 62 IN | BODY MASS INDEX: 34.6 KG/M2 | OXYGEN SATURATION: 97 % | TEMPERATURE: 99 F | WEIGHT: 188 LBS | RESPIRATION RATE: 18 BRPM

## 2025-07-31 LAB
ABSOLUTE EOSINOPHIL (SMH): 0.24 K/UL
ABSOLUTE MONOCYTE (SMH): 0.63 K/UL (ref 0.3–1)
ABSOLUTE NEUTROPHIL COUNT (SMH): 4.8 K/UL (ref 1.8–7.7)
ALBUMIN SERPL-MCNC: 3.2 G/DL (ref 3.5–5.2)
ALP SERPL-CCNC: 318 UNIT/L (ref 55–135)
ALT SERPL-CCNC: 89 UNIT/L (ref 10–44)
ANION GAP (SMH): 5 MMOL/L (ref 8–16)
AST SERPL-CCNC: 20 UNIT/L (ref 10–40)
BASOPHILS # BLD AUTO: 0.1 K/UL
BASOPHILS NFR BLD AUTO: 1.5 %
BILIRUB SERPL-MCNC: 0.7 MG/DL (ref 0.1–1)
BUN SERPL-MCNC: 11 MG/DL (ref 8–23)
CALCIUM SERPL-MCNC: 8.8 MG/DL (ref 8.7–10.5)
CHLORIDE SERPL-SCNC: 102 MMOL/L (ref 95–110)
CO2 SERPL-SCNC: 31 MMOL/L (ref 23–29)
CREAT SERPL-MCNC: 0.7 MG/DL (ref 0.5–1.4)
ERYTHROCYTE [DISTWIDTH] IN BLOOD BY AUTOMATED COUNT: 13 % (ref 11.5–14.5)
GFR SERPLBLD CREATININE-BSD FMLA CKD-EPI: >60 ML/MIN/1.73/M2
GLUCOSE SERPL-MCNC: 249 MG/DL (ref 70–110)
HCT VFR BLD AUTO: 36 % (ref 37–48.5)
HGB BLD-MCNC: 11.5 GM/DL (ref 12–16)
IMM GRANULOCYTES # BLD AUTO: 0.04 K/UL (ref 0–0.04)
IMM GRANULOCYTES NFR BLD AUTO: 0.6 % (ref 0–0.5)
LYMPHOCYTES # BLD AUTO: 0.98 K/UL (ref 1–4.8)
MAGNESIUM SERPL-MCNC: 1.8 MG/DL (ref 1.6–2.6)
MCH RBC QN AUTO: 30 PG (ref 27–31)
MCHC RBC AUTO-ENTMCNC: 31.9 G/DL (ref 32–36)
MCV RBC AUTO: 94 FL (ref 82–98)
NUCLEATED RBC (/100WBC) (SMH): 0 /100 WBC
PLATELET # BLD AUTO: 202 K/UL (ref 150–450)
PMV BLD AUTO: 9.5 FL (ref 9.2–12.9)
POCT GLUCOSE: 239 MG/DL (ref 70–110)
POCT GLUCOSE: 276 MG/DL (ref 70–110)
POTASSIUM SERPL-SCNC: 3.6 MMOL/L (ref 3.5–5.1)
PROT SERPL-MCNC: 5.7 GM/DL (ref 6–8.4)
RBC # BLD AUTO: 3.83 M/UL (ref 4–5.4)
RELATIVE EOSINOPHIL (SMH): 3.6 % (ref 0–8)
RELATIVE LYMPHOCYTE (SMH): 14.5 % (ref 18–48)
RELATIVE MONOCYTE (SMH): 9.3 % (ref 4–15)
RELATIVE NEUTROPHIL (SMH): 70.5 % (ref 38–73)
SODIUM SERPL-SCNC: 138 MMOL/L (ref 136–145)
WBC # BLD AUTO: 6.75 K/UL (ref 3.9–12.7)

## 2025-07-31 PROCEDURE — 27000221 HC OXYGEN, UP TO 24 HOURS

## 2025-07-31 PROCEDURE — 25000003 PHARM REV CODE 250

## 2025-07-31 PROCEDURE — 85025 COMPLETE CBC W/AUTO DIFF WBC: CPT

## 2025-07-31 PROCEDURE — 63700000 PHARM REV CODE 250 ALT 637 W/O HCPCS: Performed by: INTERNAL MEDICINE

## 2025-07-31 PROCEDURE — 83735 ASSAY OF MAGNESIUM: CPT

## 2025-07-31 PROCEDURE — 63600175 PHARM REV CODE 636 W HCPCS

## 2025-07-31 PROCEDURE — 99900035 HC TECH TIME PER 15 MIN (STAT)

## 2025-07-31 PROCEDURE — 94761 N-INVAS EAR/PLS OXIMETRY MLT: CPT

## 2025-07-31 PROCEDURE — 36415 COLL VENOUS BLD VENIPUNCTURE: CPT

## 2025-07-31 PROCEDURE — 97116 GAIT TRAINING THERAPY: CPT

## 2025-07-31 PROCEDURE — 97535 SELF CARE MNGMENT TRAINING: CPT

## 2025-07-31 PROCEDURE — 80053 COMPREHEN METABOLIC PANEL: CPT

## 2025-07-31 RX ORDER — DOXYLAMINE SUCCINATE 25 MG
TABLET ORAL 2 TIMES DAILY
Qty: 106 G | Refills: 3 | Status: SHIPPED | OUTPATIENT
Start: 2025-07-31

## 2025-07-31 RX ORDER — FLUCONAZOLE 200 MG/1
200 TABLET ORAL DAILY
Qty: 10 TABLET | Refills: 0 | Status: SHIPPED | OUTPATIENT
Start: 2025-08-01 | End: 2025-08-11

## 2025-07-31 RX ADMIN — INSULIN ASPART 3 UNITS: 100 INJECTION, SOLUTION INTRAVENOUS; SUBCUTANEOUS at 01:07

## 2025-07-31 RX ADMIN — FLUCONAZOLE 400 MG: 100 TABLET ORAL at 09:07

## 2025-07-31 RX ADMIN — TRIAMCINOLONE ACETONIDE: 1 CREAM TOPICAL at 09:07

## 2025-07-31 RX ADMIN — ACETAMINOPHEN, DEXTROMETHORPHAN HYDROBROMIDE, GUAIFENESIN, AND PHENYLEPHRINE HYDROCHLORIDE: 325; 10; 200; 5 TABLET, COATED ORAL at 09:07

## 2025-07-31 RX ADMIN — WHITE PETROLATUM 41 % TOPICAL OINTMENT: at 09:07

## 2025-07-31 RX ADMIN — METRONIDAZOLE 500 MG: 5 INJECTION, SOLUTION INTRAVENOUS at 02:07

## 2025-07-31 RX ADMIN — LOSARTAN POTASSIUM 12.5 MG: 25 TABLET, FILM COATED ORAL at 09:07

## 2025-07-31 RX ADMIN — PANTOPRAZOLE SODIUM 40 MG: 40 TABLET, DELAYED RELEASE ORAL at 06:07

## 2025-07-31 RX ADMIN — DULOXETINE 60 MG: 30 CAPSULE, DELAYED RELEASE ORAL at 09:07

## 2025-07-31 RX ADMIN — INSULIN ASPART 2 UNITS: 100 INJECTION, SOLUTION INTRAVENOUS; SUBCUTANEOUS at 09:07

## 2025-07-31 RX ADMIN — METRONIDAZOLE 500 MG: 5 INJECTION, SOLUTION INTRAVENOUS at 09:07

## 2025-07-31 RX ADMIN — CEFTRIAXONE 2 G: 2 INJECTION, POWDER, FOR SOLUTION INTRAMUSCULAR; INTRAVENOUS at 01:07

## 2025-07-31 RX ADMIN — MUPIROCIN 1 G: 20 OINTMENT TOPICAL at 09:07

## 2025-07-31 NOTE — CARE UPDATE
07/31/25 0051   Patient Assessment/Suction   Level of Consciousness (AVPU) alert   PRE-TX-O2   Device (Oxygen Therapy) nasal cannula   $ Is the patient on Low Flow Oxygen? Yes   Flow (L/min) (Oxygen Therapy) 2   SpO2 99 %   Pulse Oximetry Type Intermittent   $ Pulse Oximetry - Multiple Charge Pulse Oximetry - Multiple

## 2025-07-31 NOTE — PLAN OF CARE
Patient discharging today to home. Son present at bedside, available for discharge instructions. DC instructions reviewed with pt and son and all questions answered. No c/o pain. Turn Q2. IV removed prior to dc, cath tip intact. Pt discharged from unit in stable condition.  Problem: Adult Inpatient Plan of Care  Goal: Plan of Care Review  Outcome: Met  Goal: Patient-Specific Goal (Individualized)  Outcome: Met  Goal: Absence of Hospital-Acquired Illness or Injury  Outcome: Met  Goal: Optimal Comfort and Wellbeing  Outcome: Met  Goal: Readiness for Transition of Care  Outcome: Met     Problem: Diabetes Comorbidity  Goal: Blood Glucose Level Within Targeted Range  Outcome: Met     Problem: Hospitalized Older Adult  Goal: Optimal Cognitive Function  Outcome: Met  Goal: Effective Bowel Elimination  Outcome: Met  Goal: Optimal Coping  Outcome: Met  Goal: Fluid and Electrolyte Balance  Outcome: Met  Goal: Optimal Functional Ability  Outcome: Met  Goal: Improved Oral Intake  Outcome: Met  Goal: Adequate Sleep/Rest  Outcome: Met  Goal: Effective Urinary Elimination  Outcome: Met     Problem: Skin Injury Risk Increased  Goal: Skin Health and Integrity  Outcome: Met     Problem: Fall Injury Risk  Goal: Absence of Fall and Fall-Related Injury  Outcome: Met     Problem: Infection  Goal: Absence of Infection Signs and Symptoms  Outcome: Met     Problem: Wound  Goal: Optimal Coping  Outcome: Met  Goal: Optimal Functional Ability  Outcome: Met  Goal: Absence of Infection Signs and Symptoms  Outcome: Met  Goal: Improved Oral Intake  Outcome: Met  Goal: Optimal Pain Control and Function  Outcome: Met  Goal: Skin Health and Integrity  Outcome: Met  Goal: Optimal Wound Healing  Outcome: Met

## 2025-07-31 NOTE — PLAN OF CARE
Problem: Diabetes Comorbidity  Goal: Blood Glucose Level Within Targeted Range  Outcome: Progressing     Problem: Hospitalized Older Adult  Goal: Fluid and Electrolyte Balance  Outcome: Progressing  Goal: Effective Urinary Elimination  Outcome: Progressing     Problem: Infection  Goal: Absence of Infection Signs and Symptoms  Outcome: Progressing     Problem: Wound  Goal: Skin Health and Integrity  Outcome: Progressing  Goal: Optimal Wound Healing  Outcome: Progressing

## 2025-07-31 NOTE — PT/OT/SLP PROGRESS
Occupational Therapy   Treatment    Name: Maryam White  MRN: [id number]  Admitting Diagnosis:  Pancreatitis  2 Days Post-Op    Recommendations:     Discharge Recommendations: Low Intensity Therapy  Discharge Equipment Recommendations:  none  Barriers to discharge:  None    Assessment:     Maryam White is a 84 y.o. female with a medical diagnosis of Pancreatitis. Pt agreeable to OT limited therapy session this AM. Performance deficits affecting function are weakness, impaired endurance, impaired self care skills, impaired functional mobility, gait instability, impaired balance, impaired cognition, decreased coordination, decreased upper extremity function, decreased lower extremity function, decreased safety awareness, impaired cardiopulmonary response to activity. Initially agreeable to sit EOB to perform g/h, but then wished to perform supine. Extended entered into room to get pt cleaned at end of session.    Rehab Prognosis:  Fair; patient would benefit from acute skilled OT services to address these deficits and reach maximum level of function.       Plan:     Patient to be seen 3 x/week to address the above listed problems via therapeutic activities, therapeutic exercises, self-care/home management  Plan of Care Expires: 08/29/25  Plan of Care Reviewed with: patient    Subjective     Chief Complaint: fatigue  Patient/Family Comments/goals: none stated  Pain/Comfort:  Pain Rating 1: 0/10    Objective:     Communicated with: nursing prior to session.  Patient found HOB elevated with telemetry, oxygen, lamar catheter, peripheral IV, bed alarm upon OT entry to room.    General Precautions: Standard, fall    Orthopedic Precautions:N/A  Braces: N/A  Respiratory Status: Nasal cannula, flow 2 L/min     Occupational Performance:     Bed Mobility:    declined    Activities of Daily Living:  Grooming: setup assistance bed level to brush teeth and wash face; initially agreeable to sit on EOB to complete, but then declined       Treatment & Education:  Pt educated on role of OT/POC, importance of OOB/EOB activity, use of call bell, and safety during ADLs, transfers, and functional mobility.    Patient left HOB elevated with all lines intact, call button in reach, bed alarm on, and nurse and extender present    GOALS:   Multidisciplinary Problems       Occupational Therapy Goals          Problem: Occupational Therapy    Goal Priority Disciplines Outcome Interventions   Occupational Therapy Goal     OT, PT/OT     Description: Goals to be met by: 8/29/25     Patient will increase functional independence with ADLs by performing:    UE Dressing with Set-up Assistance.  LE Dressing with Set-up Assistance.  Grooming while seated with Supervision.  Toileting from toilet with Supervision for hygiene and clothing management.   Toilet transfer to toilet with Supervision.                         Time Tracking:     OT Date of Treatment: 07/31/25  OT Start Time: 1046  OT Stop Time: 1102  OT Total Time (min): 16 min    Billable Minutes:Self Care/Home Management 16    OT/RODNEY: OT          7/31/2025

## 2025-07-31 NOTE — ASSESSMENT & PLAN NOTE
- resolved.  Resume low-fat diet.  ERCP results reviewed. Continue supportive care with antiemetics and pain medication.  Currently on IV ceftriaxone and Flagyl for empiric treatment for cholangitis.  Antibiotics discontinued on discharge.

## 2025-07-31 NOTE — PT/OT/SLP PROGRESS
Physical Therapy Treatment    Patient Name:  Maryam White   MRN:  [id number]    Recommendations:     Discharge Recommendations: Low Intensity Therapy  Discharge Equipment Recommendations: to be determined by next level of care  Barriers to discharge: medical status    Assessment:     Maryam White is a 84 y.o. female admitted with a medical diagnosis of Pancreatitis.  She presents with the following impairments/functional limitations: weakness, impaired endurance, impaired self care skills, impaired functional mobility, gait instability, impaired balance, decreased safety awareness, decreased lower extremity function, impaired cardiopulmonary response to activity Patient agreed to PT session. Patient given cues for safe hand placement during bed mobility and transfers. Ambulated with decreased josias and decreased foot clearance with forward flexed posture; oxygen in tow.    Rehab Prognosis: Good; patient would benefit from acute skilled PT services to address these deficits and reach maximum level of function.    Recent Surgery: Procedure(s) (LRB):  ERCP (ENDOSCOPIC RETROGRADE CHOLANGIOPANCREATOGRAPHY) (N/A) 2 Days Post-Op    Plan:     During this hospitalization, patient to be seen 6 x/week to address the identified rehab impairments via gait training, therapeutic activities, therapeutic exercises, neuromuscular re-education and progress toward the following goals:    Plan of Care Expires:  08/29/25    Subjective     Chief Complaint: doing ok but wants to rest in bed  Patient/Family Comments/goals: to rest  Pain/Comfort:  Pain Rating 1: 0/10      Objective:     Communicated with nurse prior to session.  Patient found HOB elevated with telemetry, oxygen, lamar catheter, peripheral IV, bed alarm upon PT entry to room.     General Precautions: Standard, fall  Orthopedic Precautions: N/A  Braces: N/A  Respiratory Status: Room air     Functional Mobility:  Bed Mobility:     Supine to Sit: minimum assistance  Sit to  Supine: minimum assistance  Transfers:     Sit to Stand:  minimum assistance with rolling walker  Gait: 50ft CGA RW      AM-PAC 6 CLICK MOBILITY  Turning over in bed (including adjusting bedclothes, sheets and blankets)?: 3  Sitting down on and standing up from a chair with arms (e.g., wheelchair, bedside commode, etc.): 3  Moving from lying on back to sitting on the side of the bed?: 3  Moving to and from a bed to a chair (including a wheelchair)?: 3  Need to walk in hospital room?: 3  Climbing 3-5 steps with a railing?: 2  Basic Mobility Total Score: 17       Treatment & Education:  Pt educated on POC, discharge recommendation, need for assist with mobility, use of call bell to seek assistance as needed and fall prevention      Patient left HOB elevated with all lines intact, call button in reach, and bed alarm on..    GOALS:   Multidisciplinary Problems       Physical Therapy Goals          Problem: Physical Therapy    Goal Priority Disciplines Outcome Interventions   Physical Therapy Goal     PT, PT/OT Progressing    Description: Goals to be met by: 25     Patient will increase functional independence with mobility by performin. Supine to sit with Stand-by Assistance  2. Sit to supine with Stand-by Assistance  3. Sit to stand transfer with Contact Guard Assistance  4. Bed to chair transfer with Contact Guard Assistance using Rolling Walker  5. Gait  x 50 feet with Minimal Assistance using Rolling Walker.                          DME Justifications:  No DME recommended requiring DME justifications    Time Tracking:     PT Received On: 25  PT Start Time: 1115     PT Stop Time: 1133  PT Total Time (min): 18 min     Billable Minutes: Gait Training 18    Treatment Type: Treatment  PT/PTA: PT     Number of PTA visits since last PT visit: 0     2025

## 2025-07-31 NOTE — PHYSICIAN QUERY
Please further clarify the diagnosis or diagnoses associated with the below clinical findings  Acute pancreatitis due to gallstone

## 2025-07-31 NOTE — DISCHARGE SUMMARY
"[facility]  Discharge Summary      Patient Name: Maryam White  MRN: [id number]  ADI: [id number]  Patient Class: IP- Inpatient  Admission Date: 7/27/2025  Hospital Length of Stay: 4 days  Discharge Date and Time: 07/31/2025 11:10 AM  Attending Physician: Eduardo Irizarry MD   Discharging Provider: Eduardo Irizarry MD  Primary Care Provider: Adan Jarrell NP    Primary Care Team: Networked reference to record PCT     HPI:   As per  note:    84 year old female with PMHx of HTN, HLD, DMII, GERD and prior cholecystectomy with 2 different ERCP's after (most recently in January 2023 where she had multiple stones in CBD) who presented to [facility] with abdominal pain and rigors. Labs showed elevated lipase 233, tbili 2.3,  and . WBC 1.9, Hb/Hct 13.3/39.9. Elevated LA 3.9 , got IV fluids and improved to 2.6. Gotten 2.5L of IVF. CT shows pancreas unremarkable, "mild prominence of the intra and extrahepatic ducts, likely due to post cholecystectomy changes." U/A negative for UTI. Blood cultures in process. Given IV CTX initially and then broadened to IV zosyn BP: 99/50 HR 87 on 2L NC. Transfer requested for further evaluated by GI.   Records not seen in EPIC.    At the time of my exam, she did not have any active complaints. Pleasantly confused with her baseline dementia. Son and daughter in law at bedside, they mentioned she did have gallstones in the past and underwent ERCP and does have history of pancreatitis.  Only complaint she had at home was nausea.   Admitting to ICU given no bed availability on floor. Repeat labs pending at the time of admission  GI consulted and ordered MRCP        Procedure(s) (LRB):  ERCP (ENDOSCOPIC RETROGRADE CHOLANGIOPANCREATOGRAPHY) (N/A)      Hospital Course:   84-year-old female with history of prior cholecystectomy, prior ERCP with multiple stones and CBD, presented with abdominal pain.  She was closely monitored after hospitalization.  " LFTs were elevated.  CT abdomen at outside hospital showed mild prominence of intra and extrahepatic ducts, pancreas unremarkable.  Lactic acid as well as elevated and improved with IV fluids.  She was started on IV ceftriaxone and IV Flagyl.  MRCP showed mildly prominent CBD with periampullary defects suggestive of stones.  Gastroenterology for which patient underwent ERCP on July 29, 2025 and stones were successfully removed..  Meanwhile she was kept on clear liquid diet and tolerated well.  Patient's caretaker and son Kofi requested a phone call with update while he is not here/as patient has dementia.  Patient noted to have extensive perineal and abdominal folds candidiasis which required topical and oral antifungal treatments.  Patient to be discharged home with 7 day course of fluconazole with miconazole cream twice daily.     Goals of Care Treatment Preferences:  Code Status: Full Code         Consults:   Consults (From admission, onward)          Status Ordering Provider     Inpatient consult to Gastroenterology  Once        Provider:  Kameron Chapman MD    Completed CARLI STEVENS            Assessment & Plan  Pancreatitis  - resolved.  Resume low-fat diet.  ERCP results reviewed. Continue supportive care with antiemetics and pain medication.  Currently on IV ceftriaxone and Flagyl for empiric treatment for cholangitis.  Antibiotics discontinued on discharge.    Elevated LFTs  - MRCP shows possible gallstones  - cholecystectomy in the past  - status post ERCP July 29, 2025.    Depressive disorder  Aware  Home meds      Essential hypertension  Patient's blood pressure range in the last 24 hours was: BP  Min: 124/66  Max: 168/78.The patient's inpatient anti-hypertensive regimen is listed below:  Current Antihypertensives  losartan split tablet 12.5 mg, Daily, Oral  hydrALAZINE injection 10 mg, Every 6 hours PRN, Intravenous    Plan  - restart home meds at a lower dose and watch  closely/adjust  Hyperlipidemia  Hold statin with elevated LFT    Type 2 diabetes mellitus with other specified complication  Check blood glucose level q AC/HS.  Use Novolog Insulin Sliding Scale as needed.   Continue American Diabetic Association 1800 Kcal diet.    Primary insomnia  Home Trazodone    Lactic acidosis  IVF  Repeat lactate normal  Follow cultures  IV Ceftriaxone and Flagyl empirically    Psoriasis  - With yeast infection ( refer to pics)  - Topical triamcinolone at home  - Topical miconazole  - oral Fluconazole 200 mg    Final Active Diagnoses:    Diagnosis Date Noted POA    PRINCIPAL PROBLEM:  Pancreatitis [K85.90] 07/27/2025 Yes    Elevated LFTs [R79.89] 07/28/2025 Yes    Lactic acidosis [E87.20] 07/27/2025 Yes    Hyperlipidemia [E78.5] 01/24/2020 Yes    Primary insomnia [F51.01] 01/24/2020 Yes    Psoriasis [L40.9] 01/24/2020 Yes    Type 2 diabetes mellitus with other specified complication [E11.69] 08/11/2016 Yes    Depressive disorder [F32.A] 09/16/2014 Yes    Essential hypertension [I10] 09/16/2014 Yes      Problems Resolved During this Admission:       Discharged Condition: stable    Disposition: Home or Self Care    Follow Up:   Follow-up Information       Adan Jarrell NP. Go on 8/7/2025.    Specialty: Family Medicine  Why: 10 am for hospital follow up  Contact information:  [address] [place] [address] [place]AABISAI [place] [address] [phone]                           Patient Instructions:   No discharge procedures on file.    Significant Diagnostic Studies: Labs: All labs within the past 24 hours have been reviewed  Radiology:  ERCP    Pending Diagnostic Studies:       None           Medications:  Reconciled Home Medications:      Medication List        START taking these medications      fluconazole 200 MG Tab  Commonly known as: DIFLUCAN  Take 1 tablet (200 mg total) by mouth once daily. for 10 days  Start taking on: August 1, 2025     miconazole 2 % cream  Commonly known as: MICOTIN  Apply topically 2  (two) times daily.            CHANGE how you take these medications      glipiZIDE 5 MG tablet  Commonly known as: GLUCOTROL  Take 1 tablet (5 mg total) by mouth 3 (three) times daily.  What changed: when to take this     LANTUS SOLOSTAR U-100 INSULIN 100 unit/mL (3 mL) Inpn pen  Generic drug: insulin glargine U-100 (Lantus)  Inject 37 Units into the skin once daily.     traZODone 50 MG tablet  Commonly known as: DESYREL  TK ONE T PO QHS PRN  What changed:   how much to take  how to take this  when to take this            CONTINUE taking these medications      albuterol 90 mcg/actuation inhaler  Commonly known as: VENTOLIN HFA  Inhale 1-2 puffs into the lungs every 6 (six) hours as needed for Shortness of Breath or Wheezing. Rescue     aspirin 81 MG EC tablet  Commonly known as: ECOTRIN  Take 81 mg by mouth once daily.     atorvastatin 40 MG tablet  Commonly known as: LIPITOR  Take 1 tablet (40 mg total) by mouth once daily.     azelastine 137 mcg (0.1 %) nasal spray  Commonly known as: ASTELIN  2 sprays (274 mcg total) by Nasal route 2 (two) times daily.     DULoxetine 60 MG capsule  Commonly known as: CYMBALTA  Take 1 capsule (60 mg total) by mouth once daily.     lancets 33 gauge Misc  Commonly known as: BD ULTRA FINE LANCETS  1 lancet by Misc.(Non-Drug; Combo Route) route 2 (two) times daily as needed.     losartan 100 MG tablet  Commonly known as: COZAAR  TK 1 T PO  D FOR BLOOD PRESSURE     NINJACOF 12.5-12.5 mg/5 mL Liqd  Generic drug: pyrilamine-chlophedianoL  Take 5-10 mLs by mouth every 6 to 8 hours as needed (Cough).     triamcinolone acetonide 0.025% 0.025 % cream  Commonly known as: KENALOG  Apply topically 2 (two) times daily. Breast area - folds of the skin              Indwelling Lines/Drains at time of discharge:   Lines/Drains/Airways       None                       Time spent on the discharge of patient: 30 minutes         Eduardo Irizarry MD  Department of Hospital Medicine  [facility]  [facility]

## 2025-07-31 NOTE — ASSESSMENT & PLAN NOTE
Patient's blood pressure range in the last 24 hours was: BP  Min: 124/66  Max: 168/78.The patient's inpatient anti-hypertensive regimen is listed below:  Current Antihypertensives  losartan split tablet 12.5 mg, Daily, Oral  hydrALAZINE injection 10 mg, Every 6 hours PRN, Intravenous    Plan  - restart home meds at a lower dose and watch closely/adjust

## 2025-07-31 NOTE — CARE UPDATE
07/30/25 9220   Patient Assessment/Suction   Level of Consciousness (AVPU) alert   Respiratory Effort Normal   Expansion/Accessory Muscles/Retractions no retractions;no use of accessory muscles;expansion symmetric   All Lung Fields Breath Sounds diminished;clear   SHERI Breath Sounds diminished;clear   LLL Breath Sounds diminished;clear   RUL Breath Sounds diminished;clear   RML Breath Sounds diminished;clear   RLL Breath Sounds diminished;clear   Rhythm/Pattern, Respiratory unlabored;pattern regular;depth regular   Cough Frequency no cough   Skin Integrity   $ Wound Care Tech Time 15 min   Area Observed Left;Right;Behind ear;Nares   Skin Appearance without discoloration   Aerosol Therapy   $ Aerosol Therapy Charges Aerosol Treatment   Daily Review of Necessity (SVN) completed   Respiratory Treatment Status (SVN) given   Treatment Route (SVN) mask;oxygen   Patient Position HOB elevated   Post Treatment Assessment (SVN) breath sounds unchanged   Signs of Intolerance (SVN) none   Breath Sounds Post-Respiratory Treatment   Throughout All Fields Post-Treatment All Fields   Throughout All Fields Post-Treatment no change   Post-treatment Heart Rate (beats/min) 88   Post-treatment Resp Rate (breaths/min) 16

## 2025-07-31 NOTE — PLAN OF CARE
Pt clear for DC from case management standpoint. Discharging to home with [facility]  services with SOC 8/1/25. Appt scheduled with Connor Jarrell NP for hospital follow up. Pt's family member will be transport at discharge.       07/31/25 1450   Final Note   Assessment Type Final Discharge Note   [facility] Resources/Appts/Education Provided Appointments scheduled and added to AVS   Post-Acute Status   Post-Acute Authorization Home [facility]   Home Health Status Set-up Complete/Auth obtained

## 2025-07-31 NOTE — ASSESSMENT & PLAN NOTE
- With yeast infection ( refer to pics)  - Topical triamcinolone at home  - Topical miconazole  - oral Fluconazole 200 mg

## 2025-08-01 ENCOUNTER — NURSE TRIAGE (OUTPATIENT)
Dept: ADMINISTRATIVE | Facility: CLINIC | Age: 85
End: 2025-08-01
Payer: MEDICARE

## 2025-08-01 NOTE — NURSING
Prescriptions called into [facility] pharmacy, spoke with pharmacist Jessica, fluconazole 200 mg po daily [id number] and miconazole 2% topical cream 2xdaily, family made aware, discharge instructions given and explained to pt and family, all questions asked and answered, pt left unit via w/c with family @side

## 2025-08-01 NOTE — TELEPHONE ENCOUNTER
PD day 1 escalation pt called back 2 times with no contact made VM left.   Reason for Disposition   Message left on unidentified voice mail. Phone number verified.    Protocols used: No Contact or Duplicate Contact Call-A-OH